# Patient Record
Sex: FEMALE | Race: WHITE | NOT HISPANIC OR LATINO | Employment: FULL TIME | URBAN - METROPOLITAN AREA
[De-identification: names, ages, dates, MRNs, and addresses within clinical notes are randomized per-mention and may not be internally consistent; named-entity substitution may affect disease eponyms.]

---
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
## 2021-01-25 ENCOUNTER — PATIENT OUTREACH (OUTPATIENT)
Dept: OBGYN | Facility: CLINIC | Age: 21
End: 2021-01-25

## 2021-01-25 DIAGNOSIS — R87.610 ASCUS WITH POSITIVE HIGH RISK HPV CERVICAL: ICD-10-CM

## 2021-01-25 DIAGNOSIS — R87.810 ASCUS WITH POSITIVE HIGH RISK HPV CERVICAL: ICD-10-CM

## 2021-01-25 NOTE — LETTER
January 25, 2021      Nehal Dutta  [address]  [place] [address]        Dear ,    This letter is to remind you that you are due for your follow-up Pap smear.    Please call [phone] to schedule your appointment at your earliest convenience.    If you have completed the appointment outside of the [facility] system, please have the records forwarded to our office. We will update your chart for your provider to review before your next annual wellness visit.     Thank you for choosing [facility]!      Sincerely,    Your [facility] Care Team

## 2021-02-03 NOTE — PROGRESS NOTES
SUBJECTIVE:                                                   Nehal Dutta is a 20 year old female who presents to clinic today for the following health issue(s):  Patient presents with:  Vaginal Problem: c/o sometimes painful bump for a little over a month, size of a frozen pea  Abnormal Bleeding Problem: c/o irregular periods, currently has had period for 3.5 weeks, currently on depo shot. Has had before once      HPI: patient has noticed bump up on side of clitoris for last month.  She states is only tender if touches it or something rubs on it.  Is about the size of a pea and has not changed.  She does shave in that area.   She also went off depo provera 3 months ago and she has been bleeding off and on since, this time has lasted for 3-5 weeks.  She has tried OCP's in past but was not good about remembering to take them.  Also noted she was last seen here in 2019 and had a ASCUS pap and positive for other HPV.  She was to return in 6 months for repeat pap and patient did not.  She states she thinks she probably just forgot and has not had a pap since.      Patient's last menstrual period was 2021 (approximate)..     Patient is sexually active, .  Using depo or other injectable and condoms for contraception.    reports that she has never smoked. She has never used smokeless tobacco.    STD testing offered?  Declined    Health maintenance updated:  No, due for pap    Problem list and histories reviewed & adjusted, as indicated.  Additional history: as documented.    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     Past Surgical History:   Procedure Laterality Date     NECK SURGERY  2004    cyst      Social History     Tobacco Use     Smoking status: Never Smoker      "Smokeless tobacco: Never Used   Substance Use Topics     Alcohol use: No     Alcohol/week: 0.0 standard drinks      Problem (# of Occurrences) Relation (Name,Age of Onset)    Breast Cancer (2) Paternal Aunt, Paternal Cousin    Cervical Cancer (1) Mother    Diabetes (1) Maternal Uncle    Hypertension (1) Maternal Grandmother    Myocardial Infarction (1) Father    No Known Problems (5) Sister, Brother, Maternal Grandfather, Paternal Grandmother, Other    Thyroid Disease (1) Maternal Grandmother       Negative family history of: Cancer, Cerebrovascular Disease, Glaucoma, Macular Degeneration            Current Outpatient Medications   Medication Sig     albuterol (PROAIR HFA, PROVENTIL HFA, VENTOLIN HFA) 108 (90 BASE) MCG/ACT inhaler Inhale 2 puffs into the lungs every 6 hours as needed for shortness of breath / dyspnea or wheezing     dicloxacillin (DYNAPEN) 500 MG capsule Take 1 capsule (500 mg) by mouth 3 times daily     FLOVENT  MCG/ACT inhaler Inhale 2 Puffs by mouth 2 times daily.     ipratropium - albuterol 0.5 mg/2.5 mg/3 mL (DUONEB) 0.5-2.5 (3) MG/3ML neb solution Inhale 3 mLs into the lungs     medroxyPROGESTERone (DEPO-PROVERA) 150 MG/ML IM injection Inject 150 mg into the muscle every 3 months     predniSONE (DELTASONE) 20 MG tablet Reported on 3/2/2017     No current facility-administered medications for this visit.      Allergies   Allergen Reactions     Cantaloupe [Melon]      Peanuts [Nuts]      Animal Dander Rash       ROS:  12 point review of systems negative other than symptoms noted below or in the HPI.  Genitourinary: Irregular Menses, Spotting and Vaginal bump        OBJECTIVE:     /62   Pulse 68   Ht 1.549 m (5' 1\")   Wt 42.4 kg (93 lb 6.4 oz)   LMP 01/27/2021 (Approximate)   BMI 17.65 kg/m    Body mass index is 17.65 kg/m .    Exam:  Constitutional:  Appearance: Well nourished, well developed alert, in no acute distress  Neurologic:  Mental Status:  Oriented X3.  Normal " strength and tone, sensory exam grossly normal, mentation intact and speech normal.    Psychiatric:  Mentation appears normal and affect normal/bright.  Pelvic Exam:  External Genitalia:     Normal appearance for age, no discharge present, no tenderness present, no inflammatory lesions present, color normal  On inside of left labia right above clitoris is a pea size lump.  Not red, is tender to touch.  Able to see a white core appears like a ingrown hair.    Vagina:     Normal vaginal vault without central or paravaginal defects, no discharge present, no inflammatory lesions present, no masses present  Bladder:     Nontender to palpation  Urethra:   Urethral Body:  Urethra palpation normal, urethra structural support normal   Urethral Meatus:  No erythema or lesions present  Cervix:     Appearance healthy, no lesions present, nontender to palpation, light bleeding present    Perineum:     Perineum within normal limits, no evidence of trauma, no rashes or skin lesions present  Anus:     Anus within normal limits, no hemorrhoids present  Inguinal Lymph Nodes:     No lymphadenopathy present  Pubic Hair:     Normal pubic hair distribution for age  Genitalia and Groin:     No rashes present, no lesions present, no areas of discoloration, no masses present       In-Clinic Test Results:  No results found for this or any previous visit (from the past 24 hour(s)).    ASSESSMENT/PLAN:                                                        ICD-10-CM    1. ASCUS with positive high risk HPV cervical  R87.610 Pap imaged thin layer screen reflex to HPV if ASCUS - recommended age 25 - 29 years    R87.810    2. Ingrown hair  L73.1 dicloxacillin (DYNAPEN) 500 MG capsule   3. DUB (dysfunctional uterine bleeding)  N93.8 US Transvaginal Non OB     Patient to hot pack pea size lump 2-3 times a day.  Start dicloxacillin.  Return or a pelvic US and to see me after for DUB and will amanda lump.  Pap Done      Earlene Gomez, APRN  CNP  M [facility] [place]

## 2021-02-15 ENCOUNTER — OFFICE VISIT (OUTPATIENT)
Dept: OBGYN | Facility: CLINIC | Age: 21
End: 2021-02-15
Payer: COMMERCIAL

## 2021-02-15 VITALS
DIASTOLIC BLOOD PRESSURE: 62 MMHG | SYSTOLIC BLOOD PRESSURE: 114 MMHG | HEIGHT: 61 IN | WEIGHT: 93.4 LBS | BODY MASS INDEX: 17.64 KG/M2 | HEART RATE: 68 BPM

## 2021-02-15 DIAGNOSIS — R87.810 ASCUS WITH POSITIVE HIGH RISK HPV CERVICAL: Primary | ICD-10-CM

## 2021-02-15 DIAGNOSIS — R87.610 ASCUS WITH POSITIVE HIGH RISK HPV CERVICAL: Primary | ICD-10-CM

## 2021-02-15 DIAGNOSIS — N93.8 DUB (DYSFUNCTIONAL UTERINE BLEEDING): ICD-10-CM

## 2021-02-15 DIAGNOSIS — L73.1 INGROWN HAIR: ICD-10-CM

## 2021-02-15 PROCEDURE — 88175 CYTOPATH C/V AUTO FLUID REDO: CPT | Performed by: NURSE PRACTITIONER

## 2021-02-15 PROCEDURE — 99213 OFFICE O/P EST LOW 20 MIN: CPT | Performed by: NURSE PRACTITIONER

## 2021-02-15 RX ORDER — DICLOXACILLIN SODIUM 500 MG
500 CAPSULE ORAL 3 TIMES DAILY
Qty: 21 CAPSULE | Refills: 0 | Status: SHIPPED | OUTPATIENT
Start: 2021-02-15 | End: 2021-03-01

## 2021-02-15 ASSESSMENT — MIFFLIN-ST. JEOR: SCORE: 1136.04

## 2021-02-17 LAB
COPATH REPORT: NORMAL
PAP: NORMAL

## 2021-02-23 ENCOUNTER — PATIENT OUTREACH (OUTPATIENT)
Dept: OBGYN | Facility: CLINIC | Age: 21
End: 2021-02-23

## 2021-03-01 ENCOUNTER — OFFICE VISIT (OUTPATIENT)
Dept: OBGYN | Facility: CLINIC | Age: 21
End: 2021-03-01
Attending: NURSE PRACTITIONER
Payer: COMMERCIAL

## 2021-03-01 ENCOUNTER — ANCILLARY PROCEDURE (OUTPATIENT)
Dept: ULTRASOUND IMAGING | Facility: CLINIC | Age: 21
End: 2021-03-01
Attending: NURSE PRACTITIONER
Payer: COMMERCIAL

## 2021-03-01 VITALS
SYSTOLIC BLOOD PRESSURE: 110 MMHG | DIASTOLIC BLOOD PRESSURE: 60 MMHG | HEIGHT: 61 IN | WEIGHT: 88.6 LBS | BODY MASS INDEX: 16.73 KG/M2

## 2021-03-01 DIAGNOSIS — N93.8 DUB (DYSFUNCTIONAL UTERINE BLEEDING): ICD-10-CM

## 2021-03-01 DIAGNOSIS — L73.9 FOLLICULITIS: Primary | ICD-10-CM

## 2021-03-01 PROCEDURE — 99212 OFFICE O/P EST SF 10 MIN: CPT | Performed by: NURSE PRACTITIONER

## 2021-03-01 PROCEDURE — 76830 TRANSVAGINAL US NON-OB: CPT | Performed by: OBSTETRICS & GYNECOLOGY

## 2021-03-01 RX ORDER — DICLOXACILLIN SODIUM 500 MG
500 CAPSULE ORAL 3 TIMES DAILY
Qty: 21 CAPSULE | Refills: 0 | Status: SHIPPED | OUTPATIENT
Start: 2021-03-01 | End: 2021-03-01 | Stop reason: ALTCHOICE

## 2021-03-01 ASSESSMENT — MIFFLIN-ST. JEOR: SCORE: 1114.27

## 2021-03-01 NOTE — PROGRESS NOTES
SUBJECTIVE:                                                   Nehal Dutta is a 20 year old female who presents to clinic today for the following health issue(s):  Patient presents with:  Follow Up: US results        HPI: here for follow up on pelvic US and irregular bleeding.  Also to amanda ingrown hair.  Patient states her bleeding has stopped now, cycle seems to be normal.  Just finished dicloxacillin 3 days ago.      Patient's last menstrual period was 2021 (approximate)..     Patient is not sexually active, .  Using none for contraception.    reports that she has never smoked. She has never used smokeless tobacco.    STD testing offered?  Declined    Health maintenance updated:  no    Today's PHQ-2 Score: No flowsheet data found.  Today's PHQ-9 Score:   PHQ-9 SCORE 2017   PHQ-9 Total Score 10     Today's CARRIE-7 Score:   CARRIE-7 SCORE 2017   Total Score 8       Problem list and histories reviewed & adjusted, as indicated.  Additional history: as documented.    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     Past Surgical History:   Procedure Laterality Date     NECK SURGERY  2004    cyst      Social History     Tobacco Use     Smoking status: Never Smoker     Smokeless tobacco: Never Used   Substance Use Topics     Alcohol use: No     Alcohol/week: 0.0 standard drinks      Problem (# of Occurrences) Relation (Name,Age of Onset)    Breast Cancer (2) Paternal Aunt, Paternal Cousin    Cervical Cancer (1) Mother    Diabetes (1) Maternal Uncle    Hypertension (1) Maternal Grandmother    Myocardial Infarction (1) Father    No Known Problems (5) Sister, Brother, Maternal Grandfather, Paternal Grandmother, Other    Thyroid Disease (1) Maternal Grandmother        "Negative family history of: Cancer, Cerebrovascular Disease, Glaucoma, Macular Degeneration            Current Outpatient Medications   Medication Sig     albuterol (PROAIR HFA, PROVENTIL HFA, VENTOLIN HFA) 108 (90 BASE) MCG/ACT inhaler Inhale 2 puffs into the lungs every 6 hours as needed for shortness of breath / dyspnea or wheezing     FLOVENT  MCG/ACT inhaler Inhale 2 Puffs by mouth 2 times daily.     ipratropium - albuterol 0.5 mg/2.5 mg/3 mL (DUONEB) 0.5-2.5 (3) MG/3ML neb solution Inhale 3 mLs into the lungs     predniSONE (DELTASONE) 20 MG tablet Reported on 3/2/2017     No current facility-administered medications for this visit.      Allergies   Allergen Reactions     Cantaloupe [Melon]      Peanuts [Nuts]      Animal Dander Rash       ROS:  12 point review of systems negative other than symptoms noted below or in the HPI.  No urinary frequency or dysuria, bladder or kidney problems      OBJECTIVE:     /60   Ht 1.549 m (5' 1\")   Wt 40.2 kg (88 lb 9.6 oz)   LMP 02/04/2021 (Approximate)   Breastfeeding No   BMI 16.74 kg/m    Body mass index is 16.74 kg/m .    Exam:  Constitutional:  Appearance: Well nourished, well developed alert, in no acute distress  Neurologic:  Mental Status:  Oriented X3.  Normal strength and tone, sensory exam grossly normal, mentation intact and speech normal.    Psychiatric:  Mentation appears normal and affect normal/bright.  Pelvic Exam:  External Genitalia:     Normal appearance for age, no discharge present, no tenderness present, no inflammatory lesions present, color normal  Inside the left labia right above clitoris is ingrown hair which has started to drain serous fluid with some blood tinged.  Appears smaller and softer than previous exam 2 weeks ago.  No redness noted.  Is tender per patient.    Vagina:     Normal vaginal vault without central or paravaginal defects, no discharge present, no inflammatory lesions present, no masses present  Bladder:  "    Nontender to palpation  Urethra:   Urethral Body:  Urethra palpation normal, urethra structural support normal   Urethral Meatus:  No erythema or lesions present  Perineum:     Perineum within normal limits, no evidence of trauma, no rashes or skin lesions present  Anus:     Anus within normal limits, no hemorrhoids present  Inguinal Lymph Nodes:     No lymphadenopathy present  Pubic Hair:     Normal pubic hair distribution for age  Genitalia and Groin:     No rashes present, no lesions present, no areas of discoloration, no masses present  Discussed normal US results.       In-Clinic Test Results:  Results for orders placed or performed in visit on 03/01/21 (from the past 24 hour(s))   US Transvaginal Non OB    Narrative    US Transvaginal Non OB  Order #: [id number] Accession #: [id number]  Study Notes     Georgette Herbert on 3/1/2021  2:15 PM      Gynecological Ultrasound Report  Pelvic U/S - Transvaginal  [facility]  Referring Provider: Earlene Gomez CNP  Sonographer:  Georgette Herbert RDMS  Indication: Bleeding/Menses- Dysfunctional uterine bleeding (DUB)  LMP: No LMP recorded. (Menstrual status: Irregular Periods).     Gynecological Ultrasonography:   Uterus: anteverted. Contour is smooth/regular.  Size: 6.6 x 4.2 x 3.0 cm  Endometrium: Thickness Total 6.9 mm  Right Ovary: 2.6 x 2.4 x 1.8 cm. Wnl  Left Ovary: 3.0 x 2.8 x 1.7 cm. Wnl  Cul de Sac Free Fluid: Trace     Impression: Unremarkable pelvis       TONI DOMÍNGUEZ           ASSESSMENT/PLAN:                                                        ICD-10-CM    1. Folliculitis  L73.9 DISCONTINUED: dicloxacillin (DYNAPEN) 500 MG capsule         Will monitor cycles for now.  Continue hot packing area. If not improving daily and going away    DAPHNE Ross CNP  M [facility] [place]

## 2021-04-13 NOTE — PROGRESS NOTES
SUBJECTIVE:                                                   Nehal Dutta is a 20 year old female who presents to clinic today for the following health issue(s):  Patient presents with:  Patient Request      HPI: Patient is here with her mom.  She is 7 weeks pregnant. She has had a ultrasound to confirm this at [facility] showed a living intrauterine gestation at 7 weeks and 2 days on 21.  EDC is 21.  She has some concerns, she has miscarriage in past, concerned since she is tiny only 90 lbs she won't be able to carry a baby.  She does eat healthy diet.  She has always weighed that weight, denies losing any recently.  She also has a lot of anxiety in general, does not takes medications for it, states she will smoke or does vaping for it.  Discussed she should not be smoking or vaping during pregnancy.  Suggested seeing a therapist, patient states has done that in the past and didn't help per patient.  Patient does have a good support system with mom and sounds like baby's dad will be involved.  Patient has also lost her dad recently.  She does have some nausea, discussed eating 5-6 small meals a day to help.  She did get [facility]an [facility] and does take it occasionally for nausea.  Discussed can only use tylenol if needed.  Patient would like a set of written do's and don't during pregnancy.  She does wish to continue pregnancy wasn't sure at first.  Discussed midwives vs. Physician and she feels she wants to try midwives first.        Patient's last menstrual period was 2021 (approximate)..     Patient is sexually active, .  Using none for contraception.    reports that she has never smoked. She has never used smokeless tobacco.    STD testing offered?  Declined    Health maintenance updated:  yes    Today's PHQ-2 Score: No flowsheet data found.  Today's PHQ-9 Score:   PHQ-9 SCORE 2017   PHQ-9 Total Score 10     Today's CARRIE-7 Score:   CARRIE-7 SCORE 2017   Total Score 8        Problem list and histories reviewed & adjusted, as indicated.  Additional history: as documented.    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     Past Surgical History:   Procedure Laterality Date     NECK SURGERY  2004    cyst      Social History     Tobacco Use     Smoking status: Never Smoker     Smokeless tobacco: Never Used   Substance Use Topics     Alcohol use: No     Alcohol/week: 0.0 standard drinks      Problem (# of Occurrences) Relation (Name,Age of Onset)    Breast Cancer (2) Paternal Aunt, Paternal Cousin    Cervical Cancer (1) Mother    Diabetes (1) Maternal Uncle    Hypertension (1) Maternal Grandmother    Myocardial Infarction (1) Father    No Known Problems (5) Sister, Brother, Maternal Grandfather, Paternal Grandmother, Other    Thyroid Disease (1) Maternal Grandmother       Negative family history of: Cancer, Cerebrovascular Disease, Glaucoma, Macular Degeneration            Current Outpatient Medications   Medication Sig     albuterol (PROAIR HFA, PROVENTIL HFA, VENTOLIN HFA) 108 (90 BASE) MCG/ACT inhaler Inhale 2 puffs into the lungs every 6 hours as needed for shortness of breath / dyspnea or wheezing     FLOVENT  MCG/ACT inhaler Inhale 2 Puffs by mouth 2 times daily.     ipratropium - albuterol 0.5 mg/2.5 mg/3 mL (DUONEB) 0.5-2.5 (3) MG/3ML neb solution Inhale 3 mLs into the lungs     ondansetron (ZOFRAN) 4 MG tablet Take 4-8 mg by mouth     Prenat w/o A-FeCbGl-DSS-FA-DHA (CITRANATAL DHA) 27-1 & 250 MG MISC Take 1 tablet by mouth daily     No current facility-administered medications for this visit.      Allergies   Allergen Reactions     Cantaloupe [Melon]      Peanuts [Nuts]      Animal Dander Rash       ROS:  12 point review of systems negative  "other than symptoms noted below or in the HPI.        OBJECTIVE:     BP 90/58 (BP Location: Right arm, Patient Position: Sitting, Cuff Size: Adult Regular)   Pulse 92   Ht 1.549 m (5' 1\")   Wt 40.9 kg (90 lb 3.2 oz)   LMP 02/07/2021 (Approximate)   Breastfeeding No   BMI 17.04 kg/m    Body mass index is 17.04 kg/m .    Exam:  Constitutional:  Appearance: Well nourished, well developed alert, in no acute distress  Neurologic:  Mental Status:  Oriented X3.  Normal strength and tone, sensory exam grossly normal, mentation intact and speech normal.    Psychiatric:  Mentation appears normal and affect normal/bright.     In-Clinic Test Results:  No results found for this or any previous visit (from the past 24 hour(s)).    ASSESSMENT/PLAN:                                                        ICD-10-CM    1. Amenorrhea  N91.2 CANCELED: HCG Qual, Urine ([id number])   2. Pregnancy test positive  Z32.01 Prenat w/o A-FeCbGl-DSS-FA-DHA (CITRANATAL DHA) 27-1 & 250 MG MISC         Patient referred to midwives, next appointment in 2 weeks.  Set her up for Triage nurses to call her with first prenatal visit information and also will refer Jayla Torres  to help her.    DAPHNE Ross CNP  [facility] [place]  "

## 2021-04-14 ENCOUNTER — OFFICE VISIT (OUTPATIENT)
Dept: OBGYN | Facility: CLINIC | Age: 21
End: 2021-04-14
Payer: COMMERCIAL

## 2021-04-14 ENCOUNTER — TELEPHONE (OUTPATIENT)
Dept: OBGYN | Facility: CLINIC | Age: 21
End: 2021-04-14

## 2021-04-14 VITALS
BODY MASS INDEX: 17.03 KG/M2 | DIASTOLIC BLOOD PRESSURE: 58 MMHG | HEIGHT: 61 IN | WEIGHT: 90.2 LBS | HEART RATE: 92 BPM | SYSTOLIC BLOOD PRESSURE: 90 MMHG

## 2021-04-14 DIAGNOSIS — N91.2 AMENORRHEA: Primary | ICD-10-CM

## 2021-04-14 DIAGNOSIS — Z32.01 PREGNANCY TEST POSITIVE: ICD-10-CM

## 2021-04-14 DIAGNOSIS — F41.9 ANXIETY: Primary | ICD-10-CM

## 2021-04-14 PROCEDURE — 99213 OFFICE O/P EST LOW 20 MIN: CPT | Performed by: NURSE PRACTITIONER

## 2021-04-14 RX ORDER — ONDANSETRON 4 MG/1
4-8 TABLET, FILM COATED ORAL
COMMUNITY
Start: 2021-04-08 | End: 2021-04-21

## 2021-04-14 RX ORDER — VITAMIN C, CALCIUM, IRON, VITAMIN D3, VITAMIN E, THIAMIN, RIBOFLAVIN, NIACINAMIDE, VITAMIN B6, FOLIC ACID, IODINE, ZINC, COPPER, DOCUSATE SODIUM
1 KIT DAILY
Qty: 90 EACH | Refills: 3 | Status: SHIPPED | OUTPATIENT
Start: 2021-04-14

## 2021-04-14 ASSESSMENT — MIFFLIN-ST. JEOR: SCORE: 1121.52

## 2021-04-15 ENCOUNTER — TELEPHONE (OUTPATIENT)
Dept: OBGYN | Facility: CLINIC | Age: 21
End: 2021-04-15

## 2021-04-15 RX ORDER — SERTRALINE HYDROCHLORIDE 25 MG/1
25 TABLET, FILM COATED ORAL DAILY
Qty: 30 TABLET | Refills: 0 | Status: SHIPPED | OUTPATIENT
Start: 2021-04-15 | End: 2021-07-15

## 2021-04-15 NOTE — TELEPHONE ENCOUNTER
Informed pharmacy to fill whichever PNV is covered with DHA in it.   Yaneth Brambila RN on 4/15/2021 at 10:32 AM

## 2021-04-15 NOTE — TELEPHONE ENCOUNTER
Spoke with pt, signed her up for MyChart and sent prenatal information  Advised against vaping for anxiety, pt is open to trying Zoloft-rx sent  Discussed pt care coordination-open to this as well-referral sent and [facility] notified   Yaneth Brambila RN on 4/15/2021 at 2:46 PM

## 2021-04-15 NOTE — TELEPHONE ENCOUNTER
rec'd rejection from pharmacy that insurance does not cover prenatal that was sent.    Triage: is there an prescription to send that they can fill whatever is covered by insurance? Or does the patient need to find out that info and let us know?

## 2021-04-16 ENCOUNTER — PATIENT OUTREACH (OUTPATIENT)
Dept: NURSING | Facility: CLINIC | Age: 21
End: 2021-04-16
Payer: MEDICAID

## 2021-04-16 SDOH — ECONOMIC STABILITY: INCOME INSECURITY: HOW HARD IS IT FOR YOU TO PAY FOR THE VERY BASICS LIKE FOOD, HOUSING, MEDICAL CARE, AND HEATING?: NOT HARD AT ALL

## 2021-04-16 SDOH — SOCIAL STABILITY: SOCIAL NETWORK: HOW OFTEN DO YOU GET TOGETHER WITH FRIENDS OR RELATIVES?: MORE THAN THREE TIMES A WEEK

## 2021-04-16 SDOH — ECONOMIC STABILITY: TRANSPORTATION INSECURITY
IN THE PAST 12 MONTHS, HAS THE LACK OF TRANSPORTATION KEPT YOU FROM MEDICAL APPOINTMENTS OR FROM GETTING MEDICATIONS?: NO

## 2021-04-16 SDOH — ECONOMIC STABILITY: FOOD INSECURITY: WITHIN THE PAST 12 MONTHS, YOU WORRIED THAT YOUR FOOD WOULD RUN OUT BEFORE YOU GOT MONEY TO BUY MORE.: NEVER TRUE

## 2021-04-16 SDOH — ECONOMIC STABILITY: TRANSPORTATION INSECURITY
IN THE PAST 12 MONTHS, HAS LACK OF TRANSPORTATION KEPT YOU FROM MEETINGS, WORK, OR FROM GETTING THINGS NEEDED FOR DAILY LIVING?: NO

## 2021-04-16 SDOH — SOCIAL STABILITY: SOCIAL NETWORK: ARE YOU MARRIED, WIDOWED, DIVORCED, SEPARATED, NEVER MARRIED, OR LIVING WITH A PARTNER?: NEVER MARRIED

## 2021-04-16 SDOH — ECONOMIC STABILITY: FOOD INSECURITY: WITHIN THE PAST 12 MONTHS, THE FOOD YOU BOUGHT JUST DIDN'T LAST AND YOU DIDN'T HAVE MONEY TO GET MORE.: NEVER TRUE

## 2021-04-16 SDOH — HEALTH STABILITY: MENTAL HEALTH
STRESS IS WHEN SOMEONE FEELS TENSE, NERVOUS, ANXIOUS, OR CAN'T SLEEP AT NIGHT BECAUSE THEIR MIND IS TROUBLED. HOW STRESSED ARE YOU?: TO SOME EXTENT

## 2021-04-16 SDOH — SOCIAL STABILITY: SOCIAL NETWORK
IN A TYPICAL WEEK, HOW MANY TIMES DO YOU TALK ON THE PHONE WITH FAMILY, FRIENDS, OR NEIGHBORS?: MORE THAN THREE TIMES A WEEK

## 2021-04-16 ASSESSMENT — ACTIVITIES OF DAILY LIVING (ADL): DEPENDENT_IADLS:: INDEPENDENT

## 2021-04-16 NOTE — PROGRESS NOTES
Clinic Care Coordination Contact    Clinic Care Coordination Contact  OUTREACH    Referral Information:  Referral Source: Care Team     Primary Diagnosis: Psychosocial    Chief Complaint   Patient presents with     Clinic Care Coordination - Initial        Universal Utilization:   Clinic Utilization  Difficulty keeping appointments:: No  Compliance Concerns: No  No-Show Concerns: No  No PCP office visit in Past Year: No  Utilization    Last refreshed: 2021 11:47 AM: Hospital Admissions 0           Last refreshed: 2021 11:47 AM: ED Visits 0           Last refreshed: 2021 11:47 AM: No Show Count (past year) 0              Current as of: 2021 11:47 AM            Clinical Concerns:  CC BALAJI spoke with pt regarding her overall wellbeing. Pt shared that she continues to experience significant nausea and vomits many times a day. CC BALAJI reviewed MyChart message from 4/15 and encouraged pt to review as well. Pt is taking zofran with some success.    Pt inquired about her concerns that significant stress will lead to a miscarriage. CC BALAJI encouraged her to discuss this with midwives but explained that many miscarriages in the early stages of pregnancy are not preventable nor the fault of the mother.    Pt explained many life stressors at this time, including trouble with FOB and plans for future stability for herself and baby. Pt explained that she and FOB are not together because it was not a healthy relationship and she experienced significant anxiety. FOB stated that he would like her to get an  if she is unwilling to try to work it out with him. She is very sad by this and worries about how to explain his absence to baby in the future. GILMA CARPIO will provide resources for support groups or online groups for single parents that she can learn from others with similar experiences.    Pt would like to go to college and be a . She explained she doesn't want to be dependent on her mother forever. GILMA  BALAJI shared there are many programs that offer support to parents to further their education.     Coping skills for anxiety discussed. Currently, pt relaxes while watching Netflix, spends time with some girlfriends, and she has started journaling. Pt explained she used to have significant depression and she would self harm through cutting, she stopped this a couple years ago. She used to smoke marijuana to calm nerves and it was helpful. She was told not use during pregnancy and plans not to. She is currently, vaping but is trying to stop. She is worried about the effect this is having on baby, GILMA CARPIO encouraged her to speak with midwives about this. GILMA CARPIO advised against turning to [employer] for her concerns as this may lead to misinformation.    In high school she was taking anti-depressants for a couple months. When she stopped she became suicidal, this was a couple years ago. Pt is not currently experiencing any SI and has not for a long-time.    Pt has been fully vaccinated again COVID and is planning a trip to [place] in 2 months with her family.     Current Medical Concerns:  none    Current Behavioral Concerns: Anxiety    Education Provided to patient: GILMA role   Pain  Pain (GOAL):: No  Health Maintenance Reviewed: Up to date  Clinical Pathway: None    Medication Management:  Did not discuss at this time.    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 She enjoys her job and walks a lot. She is able to get healthy meals while she is working.    Dependent ADLs:: Independent  Dependent IADLs:: Independent  Bed or wheelchair confined:: No  Mobility Status: Independent  Fallen 2 or more times in the past year?: No  Any fall with injury in the past year?: No    Living Situation:  Pt is currently living with a roommate in the [facility]. She feels very supported by her roommate.     Pt is considering moving back to her mother's home in [place], [place] for the additional  support her mother can provide.    Lifestyle & Psychosocial Needs:  Lifestyle     Physical activity     Days per week: Not on file     Minutes per session: Not on file     Stress: To some extent     Social Needs     Financial resource strain: Not hard at all     Food insecurity     Worry: Never true     Inability: Never true     Transportation needs     Medical: No     Non-medical: No     Diet:: Regular  Inadequate nutrition (GOAL):: No  Tube Feeding: No  Inadequate activity/exercise (GOAL):: No  Significant changes in sleep pattern (GOAL): No  Transportation means:: Regular car     Alevism or spiritual beliefs that impact treatment:: No  Mental health DX:: No  Mental health management concern (GOAL):: No  Chemical Dependency Status: No Current Concerns  Informal Support system:: Family, Parent   Socioeconomic History     Marital status: Single     Spouse name: Not on file     Number of children: 0     Years of education: Not on file     Highest education level: Not on file   Occupational History     Occupation: student     Comment: [employer]     Social connections     Talks on phone: More than three times a week     Gets together: More than three times a week     Attends Jain service: Not on file     Active member of club or organization: Not on file     Attends meetings of clubs or organizations: Not on file     Relationship status: Never      Intimate partner violence     Fear of current or ex partner: Not on file     Emotionally abused: Not on file     Physically abused: Not on file     Forced sexual activity: Not on file     Tobacco Use     Smoking status: Never Smoker     Smokeless tobacco: Never Used   Substance and Sexual Activity     Alcohol use: No     Alcohol/week: 0.0 standard drinks     Drug use: No     Sexual activity: Yes     Partners: Male     Birth control/protection: Injection, Condom      Resources and Interventions:  Current Resources:    Community Resources:  [facility]([place])  Supplies used at home:: None  Equipment Currently Used at Home: none  Employment Status: employed full-time)   )    Advance Care Plan/Directive  Advanced Care Plans/Directives on file:: No    Referrals Placed: Other (support groups)    Goals        Patient Stated      1. Psychosocial (pt-stated)      Goal Statement: Before due date in November, I would like to explore the options for my future to support myself and my baby after birth to ensure our overall health and wellbeing.  Date Goal set: 4/16/2021  Barriers: None  Strengths: Strong support system, very motivated to ensure my wellbeing and what is best for baby  Date to Achieve By: 11/1/2021  Patient expressed understanding of goal: yes  Action steps to achieve this goal:  1. I will explore ways to manage my anxiety  2. I will present all questions related to concerns for pregnancy or baby to midwives or care team  3. I will outreach to Care Coordination  for further questions or concerns           Patient/Caregiver understanding: Pt reports understanding and denies any additional questions or concerns at this times. SW CC engaged in AIDET communication during encounter.    Outreach Frequency: monthly  Future Appointments              In 1 week WE TRIAGE [facility]OM    In 2 weeks Ava Pinto APRN CNM [facility] WO        Plan: CC SW will outreach to pt next month to discuss her overall wellbeing.    Navya Torres, [facility]  Clinic Care Coordinator  [facility] [place]  [facility]  [phone]  [email or web address]

## 2021-04-16 NOTE — LETTER
[facility] CARE COORDINATION  [address] [place] [address]    2021    Nehal Dutta  [address]  [place] [address]      Dear Nehal,    I am a clinic care coordinator who works with [facility]  [facility]. I wanted to thank you for spending the time to talk with me.  Below is a description of clinic care coordination and how I can further assist you.      The clinic care coordination team is made up of a registered nurse,  and community health worker who understand the health care system. The goal of clinic care coordination is to help you manage your health and improve access to the health care system in the most efficient manner. The team can assist you in meeting your health care goals by providing education, coordinating services, strengthening the communication among your providers and supporting you with any resource needs.    Please feel free to contact me at [phone] with any questions or concerns. We are focused on providing you with the highest-quality healthcare experience possible and that all starts with you.     Sincerely,     YURIY Holguin    Enclosed: I have enclosed a copy of the Complex Care Plan. This has helpful information and goals that we have talked about. Please keep this in an easy to access place to use as needed.        Support for during and after pregnancy:    https://ppsupport[place].org/  **This is an amazing resource so look around the website to see what might be helpful    Below if the link to the resources more specific to what we discussed today    - [email or web address]/-postpartum-support-[email or web address]-[email or web address]

## 2021-04-16 NOTE — LETTER
Count includes the [facility]  Complex Care Plan  About Me:    Patient Name:  Nehal Dutta    YOB: 2000  Age:         20 year old   Horace MRN:    [id number] Telephone Information:  Home Phone [phone]   Mobile [phone]       Address:  [address] [place] [address] Email address:  [email or web address]      Emergency Contact(s)    Name Relationship Lgl Grd Work Phone Home Phone Mobile Phone   1HARRIS GARZA Mother    [phone]           Health Maintenance  Health Maintenance Reviewed: Up to date    My Access Plan  Medical Emergency 911   Primary Clinic Line [facility] [phone]   24 Hour Appointment Line [phone] or  [phone]) (toll-free)   24 Hour Nurse Line [phone] (toll-free)   Preferred Urgent Care Other   Preferred Hospital [facility]  [phone]   Preferred Pharmacy [id number] - [place]     Behavioral Health Crisis Line The National Suicide Prevention Lifeline at 1-642.159.7265 or 915       My Care Team Members  Patient Care Team       Relationship Specialty Notifications Start End    No Ref-Primary, Physician PCP - General   6/14/19     Fax: [phone]         Earlene Gomez APRN CNP Assigned OBGYN Provider   2/21/21     Phone: [phone] Fax: [phone] [address] [address]    Jayla Torres LGSW Lead Care Coordinator Primary Care - CC Admissions 4/16/21             My Care Plans  Self Management and Treatment Plan  Goals and (Comments)  Goals        General    1. Psychosocial (pt-stated)     Notes - Note edited  4/16/2021 12:34 PM by Jayla Torres LGSW    Goal Statement: Before due date in November, I would like to explore the options for my future to support myself and my baby after birth to ensure our overall health and wellbeing.  Date Goal set: 4/16/2021  Barriers: None  Strengths: Strong support system,  very motivated to ensure my wellbeing and what is best for baby  Date to Achieve By: 11/1/2021  Patient expressed understanding of goal: yes  Action steps to achieve this goal:  1. I will explore ways to manage my anxiety  2. I will present all questions related to concerns for pregnancy or baby to midwives or care team  3. I will outreach to Care Coordination  for further questions or concerns                  My Medical and Care Information  Problem List   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        Care Coordination Start Date: 4/16/2021   Frequency of Care Coordination: monthly   Form Last Updated: 04/16/2021

## 2021-04-21 ENCOUNTER — NURSE TRIAGE (OUTPATIENT)
Dept: NURSING | Facility: CLINIC | Age: 21
End: 2021-04-21

## 2021-04-21 ENCOUNTER — TELEPHONE (OUTPATIENT)
Dept: MIDWIFE SERVICES | Facility: CLINIC | Age: 21
End: 2021-04-21

## 2021-04-21 DIAGNOSIS — O21.9 NAUSEA AND VOMITING IN PREGNANCY: Primary | ICD-10-CM

## 2021-04-21 RX ORDER — ONDANSETRON 4 MG/1
4-8 TABLET, FILM COATED ORAL EVERY 6 HOURS PRN
Qty: 30 TABLET | Refills: 1 | Status: SHIPPED | OUTPATIENT
Start: 2021-04-21 | End: 2021-05-10

## 2021-04-21 NOTE — TELEPHONE ENCOUNTER
Pt called and is out of her Zofran and thought her pharmacy had sent over a request 2 days as she is out.

## 2021-04-21 NOTE — TELEPHONE ENCOUNTER
Was originally prescribed Zofran from [facility]  Typically takes 2 tabs in the morning which has been effective  Has not tried any of the tips sent to her via [employer] and discussed via phone  Really encouraged Vit B 6 and Unisom at least to try as prescriptions are not first line treatment for N/V in early pregnancy  Seemed like this was overwhelming information for Nehal  She will review the tips sent to her via [employer]  Is able to stay hydrated  No emesis today-previously per pt had been throwing up 4-5 times a day     Is at work until 7 tonight so will send her a [employer] response  Yaneth Brambila RN on 4/21/2021 at 2:17 PM

## 2021-04-22 NOTE — TELEPHONE ENCOUNTER
Left message on VM.    Cramping can be normal in early pregnancy. Rest and drink water. If it doesn't go away or vaginal bleeding is occurring then call the nurse line back. We may have you come in tomorrow to look for a UTI if light cramping is still present. Please call back with any questions or concerns.    Anirudh Mahoney, SEGUN, APRN, CNM

## 2021-04-22 NOTE — TELEPHONE ENCOUNTER
"Pt reports she is eight weeks pregnant. ASHISH 11/27/21. Pt reports \"feels similar to period cramps but not as sharp, constant for past 1.5 hours\". Pt rates pain \"5-6\". Pt reports she also felt some dizziness and lightheadedness at work today. Pt reports stressed at work.  Pt denies fever, vaginal bleeding or discharge or any other acute symptoms.  Pt reports she plans to see CNM's and has spoken to one on the phone.     Advised pt CNM will be paged to contact her, call back if no response in 30 minutes or if symptoms worsening. Rest and increase fluids, elevate legs.     Pt verbalizes understanding and agrees to plan.     Jennifer street RN 04/21/21 7:47 PM  [facility] Nurse Advisor    Reason for Disposition    MODERATE-SEVERE abdominal pain (e.g., interferes with normal activities, awakens from sleep)    Additional Information    Negative: Passed out (i.e., lost consciousness, collapsed and was not responding)    Negative: Shock suspected (e.g., cold/pale/clammy skin, too weak to stand, low BP, rapid pulse)    Negative: Difficult to awaken or acting confused (e.g., disoriented, slurred speech)    Negative: Sounds like a life-threatening emergency to the triager    Negative: Followed an abdomen (stomach) injury    Negative: [1] Abdominal pain AND [2] pregnant > 20 weeks    Protocols used: PREGNANCY - ABDOMINAL PAIN LESS THAN 20 WEEKS EGA-A-AH      "

## 2021-04-23 ENCOUNTER — PRENATAL OFFICE VISIT (OUTPATIENT)
Dept: NURSING | Facility: CLINIC | Age: 21
End: 2021-04-23
Payer: COMMERCIAL

## 2021-04-23 DIAGNOSIS — Z34.01 ENCOUNTER FOR SUPERVISION OF NORMAL FIRST PREGNANCY IN FIRST TRIMESTER: ICD-10-CM

## 2021-04-23 PROCEDURE — 99207 PR NO CHARGE NURSE ONLY: CPT

## 2021-04-23 NOTE — PROGRESS NOTES
SUBJECTIVE:     HPI:    This is a 20 year old female patient,  who presents for her first obstetrical visit.    ASHISH: 2021, by Ultrasound.  She is 8w6d weeks.  Her cycles are irregular.  Her last menstrual period was abnormal.   Since her LMP, she has experienced  nausea, emesis and loss of appetite).   She denies abdominal pain, fatigue, headache, vaginal discharge, dysuria, pelvic pain, urinary urgency, lightheadedness, urinary frequency, vaginal bleeding, hemorrhoids and constipation.    Additional History: Currently vaping daily-uses every few hours. Hx of . asthma-does use inhaler daily. Anxiety hx of self harm-cutting.. Increased stress d/t not currently with Odilia.   Does have good family support.   GERD, Low BMI  Care coordination referral previously placed    Have you travelled during the pregnancy?No  Have your sexual partner(s) travelled during the pregnancy?No      HISTORY:   Planned Pregnancy: No  Marital Status: Single  Occupation:   Living in Household: Other:  roomate    Past History:  Her past medical history   Past Medical History:   Diagnosis Date     Abnormal Pap smear of cervix 10/02/2019    See problem list     Depressive disorder      Dysmenorrhea 2016     Uncomplicated asthma    .      She has a history of  first pregnancy     Since her last LMP she denies use of alcohol and street drugs and admits to the use of vaping.    Past medical, surgical, social and family history were reviewed and updated in [facility].        Current Outpatient Medications   Medication     albuterol (PROAIR HFA, PROVENTIL HFA, VENTOLIN HFA) 108 (90 BASE) MCG/ACT inhaler     ondansetron (ZOFRAN) 4 MG tablet     Prenat w/o A-FeCbGl-DSS-FA-DHA (CITRANATAL DHA) 27-1 & 250 MG MISC     sertraline (ZOLOFT) 25 MG tablet     FLOVENT  MCG/ACT inhaler     ipratropium - albuterol 0.5 mg/2.5 mg/3 mL (DUONEB) 0.5-2.5 (3) MG/3ML neb solution     No current facility-administered medications for  this visit.        ROS:   12 point review of systems negative other than symptoms noted below or in the HPI.  Constitutional: Loss of Appetite  Gastrointestinal: Nausea and Vomiting      OBJECTIVE:     EXAM:  LMP 02/07/2021 (Approximate)  There is no height or weight on file to calculate BMI.      Nurse phone visit completed. Prenatal book and folder (containing standard educational hand-outs and brochures)  will be given at the next visit to patient. Information in folder reviewed over the phone. Questions answered. Brochure given on optional screening available to assess chromosomal anomalies. Pt unsure NIPT. Pt advised to call the clinic if she has any questions or concerns related to her pregnancy. Prenatal labs future ordered. New prenatal visit scheduled on 4/30/21 with Nicole Pinto CNM.      Lab Results   Component Value Date    PAP NIL 02/15/2021     PHQ-9 score:    PHQ 4/23/2021   PHQ-9 Total Score 8   Q9: Thoughts of better off dead/self-harm past 2 weeks Not at all         CARRIE-7 SCORE 2/13/2017 4/23/2021   Total Score - 13 (moderate anxiety)   Total Score 8 13             Patient supplied answers from flow sheet for:  Prenatal OB Questionnaire.  Past Medical History  Have you ever recieved care for your mental health? : (!) Yes  Have you ever been in a major accident or suffered serious trauma?: (!) Yes(Found Dad after having MI)   was medically recommended?: (!) No  Does anyone in your home smoke?: (!) Yes  Have you been hospitalized for a nonsurgical reason excluding normal delivery?: (!) Yes(in childhood for asthma, no recent hospitalizations)  Have you ever had an abnormal pap smear?: (!) Yes    Krissy Carnes RN

## 2021-04-29 NOTE — PROGRESS NOTES
SUBJECTIVE:      HPI:   This is a 20 year old female patient,  who presents for her first obstetrical visit. Here with Jarred, not planned but families are supportive. Works as a  and has been having some cramping/pelvic discomfort, needs lifting restrictions note. Today is the 2yr anniversary of her dad passing away but feels like she is managing her moods okay, improved since she was assessed two weeks ago. Using nausea meds in the morning, taking prenatal at night which is helpful. Not ready to quit vaping.      ASHISH: 2021, by Ultrasound.  She is 8w6d weeks.  Her cycles are irregular.  Her last menstrual period was abnormal.   Since her LMP, she has experienced  nausea, emesis and loss of appetite)  She denies abdominal pain, fatigue, headache, vaginal discharge, dysuria, pelvic pain, urinary urgency, lightheadedness, urinary frequency, vaginal bleeding, hemorrhoids and constipation.     Additional History: Currently vaping daily-uses every few hours. Hx of . asthma-does use inhaler daily. Anxiety hx of self harm-cutting.. Increased stress d/t not currently with FOB-Jarred.   Does have good family support.   GERD, Low BMI  Care coordination referral previously placed     Have you travelled during the pregnancy?No  Have your sexual partner(s) travelled during the pregnancy?No        HISTORY:   Planned Pregnancy: No  Marital Status: Single  Occupation:   Living in Household: Other:  roomate     Past History:  Her past medical history   Past Medical History        Past Medical History:   Diagnosis Date     Abnormal Pap smear of cervix 10/02/2019     See problem list     Depressive disorder       Dysmenorrhea 2016     Uncomplicated asthma        .       She has a history of  first pregnancy      Since her last LMP she denies use of alcohol and street drugs and admits to the use of vaping.     Past medical, surgical, social and family history were reviewed and updated in EPIC.    OB  HISTORY  OB History    Para Term  AB Living   1 0 0 0 0 0   SAB TAB Ectopic Multiple Live Births   0 0 0 0 0      # Outcome Date GA Lbr Magnus/2nd Weight Sex Delivery Anes PTL Lv   1 Current                PERSONAL HISTORY  Exercise Habits:  Very active job, >7 miles on feet per shift.  Employment: Full time.  Her job involves heavy activity   Travel plans:  are to [place] in  x 2 week, driving.   Diet: eats regular meals  Abuse concerns? Reassess when partner not present  Hgb A1c screen:  BMI > 30: No, 1st degree family DM: No, History of GDM: No, PCOS: No, High risk ethnicity: No              Current Outpatient Medications   Medication     albuterol (PROAIR HFA, PROVENTIL HFA, VENTOLIN HFA) 108 (90 BASE) MCG/ACT inhaler     ondansetron (ZOFRAN) 4 MG tablet     Prenat w/o A-FeCbGl-DSS-FA-DHA (CITRANATAL DHA) 27-1 & 250 MG MISC     sertraline (ZOLOFT) 25 MG tablet     FLOVENT  MCG/ACT inhaler     ipratropium - albuterol 0.5 mg/2.5 mg/3 mL (DUONEB) 0.5-2.5 (3) MG/3ML neb solution      No current facility-administered medications for this visit.          ROS:   12 point review of systems negative other than symptoms noted below or in the HPI.  Constitutional: Loss of Appetite  Gastrointestinal: Nausea and Vomiting        OBJECTIVE:      EXAM:  LMP 2021 (Approximate)  There is no height or weight on file to calculate BMI.        Nurse phone visit completed. Prenatal book and folder (containing standard educational hand-outs and brochures)  will be given at the next visit to patient. Information in folder reviewed over the phone. Questions answered. Brochure given on optional screening available to assess chromosomal anomalies. Pt unsure NIPT. Pt advised to call the clinic if she has any questions or concerns related to her pregnancy. Prenatal labs future ordered. New prenatal visit scheduled on 21 with Nicole Pinto CNM.              Lab Results   Component Value Date     PAP NIL  "02/15/2021      PHQ-9 score:    PHQ 4/23/2021   PHQ-9 Total Score 8   Q9: Thoughts of better off dead/self-harm past 2 weeks Not at all            CARRIE-7 SCORE 2/13/2017 4/23/2021   Total Score - 13 (moderate anxiety)   Total Score 8 13              Patient supplied answers from flow sheet for:  Prenatal OB Questionnaire.  Past Medical History  Have you ever recieved care for your mental health? : (!) Yes  Have you ever been in a major accident or suffered serious trauma?: (!) Yes(Found Dad after having MI)   was medically recommended?: (!) No  Does anyone in your home smoke?: (!) Yes  Have you been hospitalized for a nonsurgical reason excluding normal delivery?: (!) Yes(in childhood for asthma, no recent hospitalizations)  Have you ever had an abnormal pap smear?: (!) Yes     Krissy Carnes RN            OBJECTIVE:     EXAM:  /60 (BP Location: Right arm, Patient Position: Sitting, Cuff Size: Adult Regular)   Pulse 84   Ht 1.562 m (5' 1.5\")   Wt 41.4 kg (91 lb 3.2 oz)   LMP 02/07/2021 (Approximate)   Breastfeeding No   BMI 16.95 kg/m   Body mass index is 16.95 kg/m .    GENERAL: healthy, alert and no distress  NECK: no adenopathy, no asymmetry, masses, or scars and thyroid normal to palpation  RESP: lungs clear to auscultation - no rales, rhonchi or wheezes  CV: regular rate and rhythm, normal S1 S2, no S3 or S4, no murmur, click or rub, no peripheral edema and peripheral pulses strong  ABDOMEN: soft, nontender, no hepatosplenomegaly, no masses and bowel sounds normal    MS: no gross musculoskeletal defects noted, no edema  SKIN: no suspicious lesions or rashes  NEURO: Normal strength and tone, mentation intact and speech normal  PSYCH: mentation appears normal, affect normal/bright    ASSESSMENT/PLAN:       ICD-10-CM    1. Supervision of high risk pregnancy in first trimester  O09.91 UA reflex to Microscopic     Urine Culture Aerobic Bacterial     Treponema Abs w Reflex to RPR and Titer     " Rubella Antibody IgG Quantitative     HIV Antigen Antibody Combo     Hepatitis B surface antigen     CBC with platelets     ABO/Rh type and screen     Neisseria gonorrhoeae PCR     Chlamydia trachomatis PCR     CANCELED: *UA reflex to Microscopic     CANCELED: Urine Culture Aerobic Bacterial     CANCELED: Treponema Abs w Reflex to RPR and Titer     CANCELED: Rubella Antibody IgG Quantitative     CANCELED: HIV Antigen Antibody Combo     CANCELED: Hepatitis B surface antigen     CANCELED: CBC with platelets     CANCELED: ABO/Rh type and screen   2. Nausea and vomiting during pregnancy prior to 22 weeks gestation  O21.9    3. Anxiety  F41.9 CARE COORDINATION REFERRAL   4. 9 weeks gestation of pregnancy  Z3A.09    5. BMI less than 19,adult  Z68.1    6. Mild persistent asthma without complication  J45.30    7. Nicotine dependence due to vaping tobacco product  F17.290        20 year old , On 2021 patient is 9w5d weeks of pregnancy with ASHISH of 2021, by Ultrasound     consult for US for AMA patients: NA  Genetic Testing reviewed and discussed, patient desires Innatal, reviewed chrosomal abnormalities the test adresses. Handout provided, consent reviewed and signed.     COUNSELING    Instructed on use of triage nurse line and contacting the on call CNM after hours in an emergency.     Symptoms of N&V and fatigue usually start to resolve around 12-16 weeks     Reviewed CNM philosophy, call schedule for labor and delivery, and FSH for delivery    1st OB handout given outlining appointment spacing and CNM information    Reviewed exercise and nutrition    Recommend to gain >40 pounds with her pregnancy.    Discussed OTC medications/OB med list given    Encouraged patient to take PNV's/DHA    Travel precautions discussed, no air travel after 36 weeks and COVID recommendations discussed, encouraged mask wearing and safety while traveling    Discussed frequent breaks/good supportive shoes at work  "and lifting restrictions     Reviewed next US at 20 weeks, FHTs heard today, will do at each visit, she can request images from [facility] but dating US was normal there and no medical indication for repeat    Years since she has been seen for Asthma, will let us know if she has any issues and we can place a referral for treatment if needed    Reviewed mental health, can offer therapy referral or dose increase if     Patient was going to have labs drawn today but left urine and did not get drawn, left voicemail for her to come back next week for lab only visit and make appt for lab draw with genetic testing anytime after 10 weeks, orders are in place, consent is signed    Next visit will be at 12 weeks    F/U to be addressed next visit: offer GC in urine at next visit or next week with lab draw  **Discussed Baby ASA at next visit 2 risk factors smoking and nulliparity    When I called Nehal to discuss lab visit next week she asked about urine results and any concerning findings, she reports she is worried about \"Hep B\" was diagnosed over a year ago per patient but not treated and was told it \"would go away on its own\", unable to find dx of hep B, but discussed it is part of routine labs and will screen for it next week. Upon further chart review + UPT 09/2020, Nehal describes passing big clots but denies true pregnancy as she said there was no baby just gestational sac. She does report US was done. She is unsure on exact date, no evidence in chart of US.    Will return to the clinic in 2 weeks for her next routine prenatal check.  Will call to be seen sooner if problems arise.      Ava Pinto, DAPHNE VICKERS    "

## 2021-04-30 ENCOUNTER — PRENATAL OFFICE VISIT (OUTPATIENT)
Dept: MIDWIFE SERVICES | Facility: CLINIC | Age: 21
End: 2021-04-30
Payer: COMMERCIAL

## 2021-04-30 VITALS
WEIGHT: 91.2 LBS | HEART RATE: 84 BPM | DIASTOLIC BLOOD PRESSURE: 60 MMHG | SYSTOLIC BLOOD PRESSURE: 100 MMHG | HEIGHT: 62 IN | BODY MASS INDEX: 16.78 KG/M2

## 2021-04-30 DIAGNOSIS — O09.91 SUPERVISION OF HIGH RISK PREGNANCY IN FIRST TRIMESTER: Primary | ICD-10-CM

## 2021-04-30 DIAGNOSIS — F41.9 ANXIETY: ICD-10-CM

## 2021-04-30 DIAGNOSIS — J45.30 MILD PERSISTENT ASTHMA WITHOUT COMPLICATION: ICD-10-CM

## 2021-04-30 DIAGNOSIS — O21.9 NAUSEA AND VOMITING DURING PREGNANCY PRIOR TO 22 WEEKS GESTATION: ICD-10-CM

## 2021-04-30 DIAGNOSIS — Z3A.09 9 WEEKS GESTATION OF PREGNANCY: ICD-10-CM

## 2021-04-30 DIAGNOSIS — F17.290 NICOTINE DEPENDENCE DUE TO VAPING TOBACCO PRODUCT: ICD-10-CM

## 2021-04-30 PROBLEM — N93.9 ABNORMAL UTERINE BLEEDING: Status: RESOLVED | Noted: 2017-02-13 | Resolved: 2021-04-30

## 2021-04-30 PROBLEM — O09.90 SUPERVISION OF HIGH-RISK PREGNANCY: Status: ACTIVE | Noted: 2021-04-23

## 2021-04-30 LAB
ALBUMIN UR-MCNC: NEGATIVE MG/DL
APPEARANCE UR: CLEAR
BILIRUB UR QL STRIP: NEGATIVE
COLOR UR AUTO: YELLOW
GLUCOSE UR STRIP-MCNC: NEGATIVE MG/DL
HGB UR QL STRIP: NEGATIVE
KETONES UR STRIP-MCNC: NEGATIVE MG/DL
LEUKOCYTE ESTERASE UR QL STRIP: NEGATIVE
NITRATE UR QL: NEGATIVE
PH UR STRIP: 6 PH (ref 5–7)
SOURCE: NORMAL
SP GR UR STRIP: >1.03 (ref 1–1.03)
UROBILINOGEN UR STRIP-ACNC: 0.2 EU/DL (ref 0.2–1)

## 2021-04-30 PROCEDURE — 81003 URINALYSIS AUTO W/O SCOPE: CPT | Performed by: ADVANCED PRACTICE MIDWIFE

## 2021-04-30 PROCEDURE — 87086 URINE CULTURE/COLONY COUNT: CPT | Performed by: ADVANCED PRACTICE MIDWIFE

## 2021-04-30 PROCEDURE — 99207 PR FIRST OB VISIT: CPT | Performed by: ADVANCED PRACTICE MIDWIFE

## 2021-04-30 ASSESSMENT — MIFFLIN-ST. JEOR: SCORE: 1133.99

## 2021-04-30 NOTE — LETTER
To whom it may concern,    Nehal is currently 9w6d pregnant with an ASHISH of 11/27/2021 and should avoid heavy lifting >25lbs while pregnant. If you any concerns or questions do not hesitate to contact us.                DAPHNE Sprague, CNM

## 2021-04-30 NOTE — PATIENT INSTRUCTIONS
Thank you for coming to see the Midwives at the   [facility]!      Please take prenatal vitamin with DHA and vitamin D3 2,000-3,000 international unit(s) once daily during the entire pregnancy.      We will notify you about your labs that were drawn today once we get the results back.  If you have MyChart your lab results will be posted there.      Someone from the clinic will call you personally or send you a Ploonge message with your results.      If you need any refills of medications please call your pharmacy and they will contact us.      If you have a medical emergency please call 911.      If you have any concerns about today's visit, wish to schedule another appointment, or have an urgent medical concern please call our office at [phone]. You can also make appointments through [employer].      After hours you may also call the clinic number above to be connected with [facility] after hours triage nurse.  The nurse can page the midwife on call if needed. There is always a midwife on call 24 hours a day.    Prenatal Care Recommendations:    Before 14 weeks: Dating ultrasound, genetic testing       This ultrasound helps us determine your dates accurately. Innatal (genetic screening test) can be drawn anytime after 10 weeks of gestation.    16 weeks: Optional genetic testing single AFP       This testing helps understand your baby's risk for some genetic abnormalities.    18-22 weeks:  Screening anatomy ultrasound       This testing will look for early growth abnormalities, placenta location, and may tell the baby's gender if you wish to find out.    24-27 weeks: One hour diabetes test (GCT) and complete blood count       This test helps identify diabetes of pregnancy or gestational diabetes.  We also look   at the iron in your blood and how well your blood clots.    28 - 36 weeks: Tetanus shot (Tdap)       This shot helps protect you and your baby from whooping cough.    36 weeks and  later: Group B Strep test (GBS)       This test helps predict if you need antibiotics in labor to prevent infection for your baby.    Anytime September to April:  Flu shot       This shot helps protect you and your family from the flu.  This is especially important during pregnancy.        The typical schedule after your first visit today you can expect:     Visit 2 - 12-16 weeks  Visit 3 - 20 weeks  Visit 4 - 24 weeks  Visit 5 - 28 weeks  Visit 6 - 30 weeks  Visit 7 - 32 weeks  Visit 8 - 34 weeks  Visit 9 - 36 weeks  Weekly after 36 weeks until delivery.        Any time during or after your pregnancy you may experience increased depression and/or mood changes.    We are here to support you. Please contact us if you are:    Feeling anxious    Overwhelmed or sad     Trouble sleeping    Crying uncontrollably    Trouble caring for yourself or baby.    Any thoughts of hurting yourself, your baby, or anyone else    If anything comes up between your visits or you have concerns please don't hesitate to contact us.    Secure access to your medical record:  Use [employer]pto (secure email communication and access to your chart) to send your primary care provider a message or make an appointment. Ask someone on your Team how to sign up for Apto. To log on to [email or web address] or for more information in Apto please visit the website at [email or web address].       Certified Nurse Midwife (CNM) Team    RANCHO Johnson CNM Melissa Kitzman APRN, CNM, [facility]  DAPHNE Lara DNP, DAPHNE Ibrahim DNP, RANCHO      Again, thank you for choosing the midwives at [facility].  We are excited to be a part of your pregnancy. Please let us know how we can best partner with you to improve your and your family's health.    Remedies for nausea and vomiting with pregnancy      Eat small frequent meals every 2-3 hours if possible.       Avoid food at extremes of temperature and  drinks with carbonation.      Eat foods that appeal to you, avoiding fats and spicy foods.      Avoid liquids with foods.  Drink liquids 30-60 minutes before or after eating and sip slowly.      Bread, pasta, crackers, potatoes, and rice tend to be tolerated the best.      Don't worry about what you eat in the first 3 months, it is more important that you can eat and keep it down.       Try flat ginger ale or ginger tea      Before rising in the morning, eat a small amount of crackers or dry toast.      Peppermint tea      Ginger is a herbal remedy for nausea and you can use it in any form.  There are ginger tablets you can purchase.  The dose 1000 mg a day in divided doses.       You may also try doxylamine (Unisom) 12.5 mg three times a day which is a sleeping medication along with Vitamin B6 25 mg three times a day.  This combination takes up to a week to work so give it some time.       Benadryl (diphenhydramine) 25-50 mg every 8 hour or Dramamine (dimenhydrinate)  mg by mouth every 4-6 hours. Both of these medication may cause some drowsiness      Other things that may help include an Accupressure band and acupuncture      If these methods fail there are many prescriptions that we can try    If you begin to vomit more than 5 or 6 times a day and feel that you are unable to keep anything down, call the [employer] [facility] at [phone]

## 2021-05-01 LAB
BACTERIA SPEC CULT: NO GROWTH
Lab: NORMAL
SPECIMEN SOURCE: NORMAL

## 2021-05-03 DIAGNOSIS — O09.91 SUPERVISION OF HIGH RISK PREGNANCY IN FIRST TRIMESTER: ICD-10-CM

## 2021-05-03 DIAGNOSIS — Z34.01 ENCOUNTER FOR SUPERVISION OF NORMAL FIRST PREGNANCY IN FIRST TRIMESTER: ICD-10-CM

## 2021-05-03 LAB
ERYTHROCYTE [DISTWIDTH] IN BLOOD BY AUTOMATED COUNT: 13.7 % (ref 10–15)
HCT VFR BLD AUTO: 36.3 % (ref 35–47)
HGB BLD-MCNC: 11.7 G/DL (ref 11.7–15.7)
MCH RBC QN AUTO: 29.6 PG (ref 26.5–33)
MCHC RBC AUTO-ENTMCNC: 32.2 G/DL (ref 31.5–36.5)
MCV RBC AUTO: 92 FL (ref 78–100)
PLATELET # BLD AUTO: 201 10E9/L (ref 150–450)
RBC # BLD AUTO: 3.95 10E12/L (ref 3.8–5.2)
WBC # BLD AUTO: 11.2 10E9/L (ref 4–11)

## 2021-05-03 PROCEDURE — 99000 SPECIMEN HANDLING OFFICE-LAB: CPT | Performed by: ADVANCED PRACTICE MIDWIFE

## 2021-05-03 PROCEDURE — 86901 BLOOD TYPING SEROLOGIC RH(D): CPT | Performed by: ADVANCED PRACTICE MIDWIFE

## 2021-05-03 PROCEDURE — 36415 COLL VENOUS BLD VENIPUNCTURE: CPT | Performed by: ADVANCED PRACTICE MIDWIFE

## 2021-05-03 PROCEDURE — 86780 TREPONEMA PALLIDUM: CPT | Mod: 90 | Performed by: ADVANCED PRACTICE MIDWIFE

## 2021-05-03 PROCEDURE — 85027 COMPLETE CBC AUTOMATED: CPT | Performed by: ADVANCED PRACTICE MIDWIFE

## 2021-05-03 PROCEDURE — 99N1100 PR STATISTIC VERIFI PRENATAL TRISOMY 21,18,13: Mod: 90 | Performed by: ADVANCED PRACTICE MIDWIFE

## 2021-05-03 PROCEDURE — 86900 BLOOD TYPING SEROLOGIC ABO: CPT | Performed by: ADVANCED PRACTICE MIDWIFE

## 2021-05-03 PROCEDURE — 86762 RUBELLA ANTIBODY: CPT | Performed by: ADVANCED PRACTICE MIDWIFE

## 2021-05-03 PROCEDURE — 87389 HIV-1 AG W/HIV-1&-2 AB AG IA: CPT | Performed by: ADVANCED PRACTICE MIDWIFE

## 2021-05-03 PROCEDURE — 86850 RBC ANTIBODY SCREEN: CPT | Performed by: ADVANCED PRACTICE MIDWIFE

## 2021-05-03 PROCEDURE — 87340 HEPATITIS B SURFACE AG IA: CPT | Performed by: ADVANCED PRACTICE MIDWIFE

## 2021-05-04 LAB
ABO + RH BLD: NORMAL
ABO + RH BLD: NORMAL
BLD GP AB SCN SERPL QL: NORMAL
BLOOD BANK CMNT PATIENT-IMP: NORMAL
HBV SURFACE AG SERPL QL IA: NONREACTIVE
HIV 1+2 AB+HIV1 P24 AG SERPL QL IA: NONREACTIVE
SPECIMEN EXP DATE BLD: NORMAL
T PALLIDUM AB SER QL: NONREACTIVE

## 2021-05-05 LAB — RUBV IGG SERPL IA-ACNC: 17 IU/ML

## 2021-05-06 ENCOUNTER — MYC MEDICAL ADVICE (OUTPATIENT)
Dept: OBGYN | Facility: CLINIC | Age: 21
End: 2021-05-06

## 2021-05-06 NOTE — LETTER
To Whom It May Concern:    Nehal Dutta is a client in our clinic. She is currently pregnant with an ASHISH of 11/27/2021. Currently she is working 12 hours shifts. For medical reasons, I request that she only be scheduled up to 8 hours per shift. We will continue to evaluate her throughout her pregnancy and update you on increasing her hours per shift, if ever appropriate.     Please contact us with any questions or concerns.     Thank you,  Anirudh Mahoney, SEGUN, APRN, CNM

## 2021-05-07 NOTE — TELEPHONE ENCOUNTER
We Triage -    I wrote a letter for 8 hr work days. Will you please call her and find where it should do?    Thanks  Anirudh

## 2021-05-10 ENCOUNTER — MYC MEDICAL ADVICE (OUTPATIENT)
Dept: MIDWIFE SERVICES | Facility: CLINIC | Age: 21
End: 2021-05-10

## 2021-05-10 ENCOUNTER — TELEPHONE (OUTPATIENT)
Dept: MIDWIFE SERVICES | Facility: CLINIC | Age: 21
End: 2021-05-10

## 2021-05-10 DIAGNOSIS — O21.9 NAUSEA AND VOMITING IN PREGNANCY: ICD-10-CM

## 2021-05-10 LAB — LAB SCANNED RESULT: NORMAL

## 2021-05-10 RX ORDER — ONDANSETRON 4 MG/1
4 TABLET, FILM COATED ORAL EVERY 6 HOURS PRN
Qty: 30 TABLET | Refills: 1 | Status: SHIPPED | OUTPATIENT
Start: 2021-05-10

## 2021-05-10 NOTE — TELEPHONE ENCOUNTER
Pt advised on refill sent. Advised on traveling: hydration, snacks, getting up and moving around frequently to prevent blood clots, support stockings helpful as well.  Pt verbalized understanding, in agreement with plan, and voiced no further questions.  Gabriela Cheung RN on 5/10/2021 at 11:23 AM

## 2021-05-10 NOTE — TELEPHONE ENCOUNTER
Innatal results: Negative  Fetal Fraction: 5 %      TEST RESULT INTERPRETATION   Chromosome 21 No aneuploidy detected  Results consistent with two copies of chromosome 21   Chromosome 18 No aneuploidy detected  Results consistent with two copies of chromosome 18   Chromosome 13 No aneuploidy detected  Results consistent with two copies of chromosome 13   Sex Chromosome No aneuploidy detected  Results consistent with two sex chromosomes:  female     Results received from [facility] in [place] [facility] triage.    Testing done:  Innatal Prenatal Screen    Action:  Spoke to patient and gave NORMAL results and routed to provider.     Gender:  Gender revealed to the patient on the phone. The gender is female    Pt verbalized understanding, in agreement with plan, and voiced no further questions.    Ambar Sterling RN

## 2021-05-10 NOTE — TELEPHONE ENCOUNTER
Pt stating she is continuing to have difficulty with nausea.    After her zofran ran out, she has been using recommendations of VitB6/unisom and dietary modifications with little relief.  Nausea hits her worst in the afternoons and she has difficulty eating and with work.    Also is traveling by car to [place] in 2 weeks and is very concerned about increased nausea during travel and in the increased heat.    Is asking if she can have refill of zofran as it was successful/effective for her.    Routing to provider for review/recommendations.    Ambar Sterling RN on 5/10/2021 at 10:52 AM

## 2021-05-10 NOTE — TELEPHONE ENCOUNTER
Zofran refilled per patient request, looks like there was a refill on there as well. Nausea should hopefully continue to get better and resolve between 12-16 weeks. Travel precautions, frequent breaks from heat, hydration etc.    DAPHNE Sprague, JAHM

## 2021-05-10 NOTE — TELEPHONE ENCOUNTER
Called pt with negative Innatal result, see phone encounter for result details.    Closing encounter.    Ambar Sterling RN on 5/10/2021 at 10:53 AM

## 2021-05-17 ENCOUNTER — PRENATAL OFFICE VISIT (OUTPATIENT)
Dept: MIDWIFE SERVICES | Facility: CLINIC | Age: 21
End: 2021-05-17
Payer: COMMERCIAL

## 2021-05-17 VITALS — SYSTOLIC BLOOD PRESSURE: 120 MMHG | DIASTOLIC BLOOD PRESSURE: 64 MMHG | WEIGHT: 95 LBS | BODY MASS INDEX: 17.66 KG/M2

## 2021-05-17 DIAGNOSIS — O09.91 SUPERVISION OF HIGH RISK PREGNANCY IN FIRST TRIMESTER: Primary | ICD-10-CM

## 2021-05-17 DIAGNOSIS — Z34.01 ENCOUNTER FOR SUPERVISION OF NORMAL FIRST PREGNANCY IN FIRST TRIMESTER: ICD-10-CM

## 2021-05-17 PROCEDURE — 87491 CHLMYD TRACH DNA AMP PROBE: CPT | Performed by: ADVANCED PRACTICE MIDWIFE

## 2021-05-17 PROCEDURE — 99207 PR PRENATAL VISIT: CPT | Performed by: ADVANCED PRACTICE MIDWIFE

## 2021-05-17 PROCEDURE — 87591 N.GONORRHOEAE DNA AMP PROB: CPT | Performed by: ADVANCED PRACTICE MIDWIFE

## 2021-05-17 NOTE — PROGRESS NOTES
Patient feels ok.  Reports mood as stable.  Manager talked with her regarding her hormones and ability to work.  Patient has had nausea and has had vomiting reports about 2-3 times per day..  Has only been taking zofran twice a day  Educated about diet, exercise and normal weight gain.  Happy that she has gained weight  Normal to feel movement between 18-22 weeks  Reviewed labs from 1st OB  GC/Chlamydia urine collection today  Discussed genetic screening; patient has had NIPT  Will begin 81 mg ASA daily  Warning signs discussed  Return to clinic 4 weeks    DAPHNE Humphreys, JAHM

## 2021-05-17 NOTE — PATIENT INSTRUCTIONS
Please begin taking aspirin 81 mg a day.         HEARTBURN PREVENTION    Heartburn can be incredibly uncomfortable and bothersome. It can worsen with pregnancy, certain medications, and other illnesses. Try the lifestyle tips and remedies to combat your symptoms. We encourage you to try these first before using medications.      Small frequent meals      Eat slowly       Separate eating solid foods from liquids by 30-60 minutes      Do not eat within 2 hour of going to sleep      Avoid going to sleep right after eating or prop yourself up on pillows to avoid being completely flat      Avoid acidic foods like tomatoes, spicy foods, caffeine, chocolate, peppermint, alcohol, sugar, white bread rice, and pasta (whole grains are easier to digest)      Chew gum or have a throat lozenge before eating to encourage salivation, saliva neutralizes stomach acid       Avoid tobacco and alcohol      Weight loss may also be helpful if you are overweight      NATURAL REMEDIES FOR HEARTBURN  You should avoid overuse of standard over the counter (OTC) antacids since they neutralize hydrochloric acid and arrest digestion.  Neutralized stomach acids later rebound with an increased acid secretion after the antacid wears off, which in turn produces even more stomach acid.  Prolonged use of antacids can lead to diarrhea and constipation with nausea.  Additionally, brucellae, a beneficial bacteria found in some dairy products, is killed by antacids.  It is better to try these natural remedies first.       Papaya tablets, papaya or papaya leaf tea      Fennel seeds, cumin seeds, anise seeds, and dill seed, can be eaten alone or made into tea      Essential oils to massage into the upper abdomen chamomile, fennel, lemon balm, heber sandalwood                                                      Sip a small amount of apple cider vinegar with warm water      Raw almonds      Drink a glass of milk before bed      Supplement with digestive  enzymes (can be found in most drug/health food) stores    Plain baked potatoes    Comfrey-pepsin tablets (digestive aid)    Eat raw apple peels  Medications    If natural remedies and lifestyle changes fail consider using OTC medications. Many OTC medications are available for heartburn and are often very effective.      Antacids like Tums, Mylanta, Maalox     Zantac/Pepcid    Prilosec     Most OTC medications are recommended for short term use only, if you continue to have issues despite all of these helpful methods we recommend you be seen by a primary care provider for evaluation.    For questions or concerns please call:    [facility]   [phone]

## 2021-05-18 ENCOUNTER — TELEPHONE (OUTPATIENT)
Dept: MIDWIFE SERVICES | Facility: CLINIC | Age: 21
End: 2021-05-18

## 2021-05-18 DIAGNOSIS — A74.9 CHLAMYDIA INFECTION AFFECTING PREGNANCY IN FIRST TRIMESTER: Primary | ICD-10-CM

## 2021-05-18 DIAGNOSIS — O98.811 CHLAMYDIA INFECTION AFFECTING PREGNANCY IN FIRST TRIMESTER: Primary | ICD-10-CM

## 2021-05-18 LAB
C TRACH DNA SPEC QL NAA+PROBE: POSITIVE
N GONORRHOEA DNA SPEC QL NAA+PROBE: NEGATIVE
SPECIMEN SOURCE: ABNORMAL
SPECIMEN SOURCE: NORMAL

## 2021-05-18 RX ORDER — AZITHROMYCIN 500 MG/1
1000 TABLET, FILM COATED ORAL DAILY
Qty: 2 TABLET | Refills: 0 | Status: SHIPPED | OUTPATIENT
Start: 2021-05-18 | End: 2021-05-19

## 2021-05-18 NOTE — TELEPHONE ENCOUNTER
+ Chlamydia result   Taken 1318 from [facility] lab    Routing high priority to midwife to notify.    Ambar Sterling RN on 5/18/2021 at 1:19 PM

## 2021-05-18 NOTE — RESULT ENCOUNTER NOTE
I spoke with Nehal, she was at work and prefers call back at 1-2 pm, results NOT GIVEN.    Ava Pinto, DAPHNE TYSONM

## 2021-05-18 NOTE — TELEPHONE ENCOUNTER
Called Nehal to discussed positive chlamydia at her requested time over lunch from work. We reviewed treatment, she reports 2-3 lifetime partners and is unsure who gave it to her, after discussion she is fairly certain and it was before FOB. They are not currently together, plans to discuss with him tonight. We reviewed we recommend partner treatment as well, he may choose to get tested himself and seek other treatment or we can also send. Reviewed one dose of treatment x1, plan for SHAE at next visit. No symptoms usually and no long term affects when treated. STD reported. Patient to call back with any questions or concerns. RX sent to pharmacy. Use condoms until SHAE completed.    DAPHNE Sprague, CNM

## 2021-05-20 ENCOUNTER — PATIENT OUTREACH (OUTPATIENT)
Dept: CARE COORDINATION | Facility: CLINIC | Age: 21
End: 2021-05-20

## 2021-05-20 NOTE — PROGRESS NOTES
Clinic Care Coordination Contact  [facility]/Voicemail       Clinical Data: Care Coordinator Outreach    Outreach attempted x 1.  Left message on patient's voicemail with call back information and requested return call.    Plan: Care Coordinator will try to reach patient again in 3-5 business days.    Navya Torres [facility]  Clinic Care Coordinator  [facility] Diann Kershaw  [facility]  [phone]  [email or web address]

## 2021-05-26 ENCOUNTER — MYC MEDICAL ADVICE (OUTPATIENT)
Dept: OBGYN | Facility: CLINIC | Age: 21
End: 2021-05-26

## 2021-05-27 NOTE — TELEPHONE ENCOUNTER
Attempted to call pt but unable to leave vm as mailbox full.    Sent a mychart response encouraging to take PCN as directed.    Gabriela Cheung RN on 5/27/2021 at 10:03 AM

## 2021-05-29 ENCOUNTER — HEALTH MAINTENANCE LETTER (OUTPATIENT)
Age: 21
End: 2021-05-29

## 2021-06-03 NOTE — PROGRESS NOTES
Clinic Care Coordination Contact  [facility]/Voicemail       Clinical Data: Care Coordinator Outreach    Outreach attempted x 2.  Left message on patient's voicemail with call back information and requested return call.    Plan: Care Coordinator will try to reach patient again in 1 month.    Navya Torres [facility]  Clinic Care Coordinator  [facility] Diann Sequoyah  [facility]  [phone]  [email or web address]

## 2021-06-14 ENCOUNTER — PRENATAL OFFICE VISIT (OUTPATIENT)
Dept: MIDWIFE SERVICES | Facility: CLINIC | Age: 21
End: 2021-06-14
Payer: MEDICAID

## 2021-06-14 VITALS — WEIGHT: 97 LBS | SYSTOLIC BLOOD PRESSURE: 102 MMHG | BODY MASS INDEX: 18.03 KG/M2 | DIASTOLIC BLOOD PRESSURE: 50 MMHG

## 2021-06-14 DIAGNOSIS — O09.92 SUPERVISION OF HIGH RISK PREGNANCY IN SECOND TRIMESTER: Primary | ICD-10-CM

## 2021-06-14 DIAGNOSIS — K21.9 GASTROESOPHAGEAL REFLUX DISEASE, UNSPECIFIED WHETHER ESOPHAGITIS PRESENT: ICD-10-CM

## 2021-06-14 DIAGNOSIS — O98.311 CHLAMYDIA TRACHOMATIS INFECTION IN MOTHER DURING FIRST TRIMESTER OF PREGNANCY: ICD-10-CM

## 2021-06-14 DIAGNOSIS — A56.8 CHLAMYDIA TRACHOMATIS INFECTION IN MOTHER DURING FIRST TRIMESTER OF PREGNANCY: ICD-10-CM

## 2021-06-14 PROCEDURE — 99207 PR PRENATAL VISIT: CPT | Performed by: ADVANCED PRACTICE MIDWIFE

## 2021-06-14 PROCEDURE — 87491 CHLMYD TRACH DNA AMP PROBE: CPT | Performed by: ADVANCED PRACTICE MIDWIFE

## 2021-06-14 RX ORDER — FAMOTIDINE 20 MG/1
20 TABLET, FILM COATED ORAL 2 TIMES DAILY
Qty: 180 TABLET | Refills: 0 | Status: SHIPPED | OUTPATIENT
Start: 2021-06-14 | End: 2021-09-16

## 2021-06-14 NOTE — PROGRESS NOTES
Continues to have nausea.  Not taking zofran as directed.  Throws up 2-3 times a day and complains of a sore throat.   Encouraged saline gargles. The most important thing is to control her nausea and vomiting by taking her medications as directed.   Will also prescribe for her GERD.   Complains of the baby being in her ribs and causing pressure at night.  Recommended to elevate HOB.   Complains of lightening crotch.   Doesn't have insurance.  Was in [place] recently and couldn't complete it when she was there.   Fetal movement Yes  Denies loss of fluid/vb/contractions/pelvic pain  Depression screening done  declined AFP today due to insurance.   Chlamydia positive at last visit  SHAE today  Anatomy ultrasound next visit between 18-22 weeks  Return to clinic 4 weeks    Constanza Romero, DAPHNE, JAHM

## 2021-06-14 NOTE — PATIENT INSTRUCTIONS
Constipation  What is constipation?   Constipation means that stools are difficult or painful to pass and less frequent than usual.   A child with constipation feels a strong urge to have a stool and has discomfort in the anal area, but is unable to pass a stool after straining and pushing for more than 10 minutes.   After 4 weeks or so of life, some breast-fed babies pass normal, large, soft stools at infrequent intervals (up to 7 days is not abnormal) without pain. For older children, going 3 or more days without a stool can be considered constipation, even though this may cause no pain in some children and even be normal for a few.   Common Misconceptions About Constipation   Some people normally have hard stools daily without any pain. Children who eat a lot of food pass extremely large stools. Babies less than 6 months of age commonly grunt, push, strain, draw up the legs, and become flushed in the face during passage of stools. However, they usually don't cry. These behaviors are normal since it is difficult to produce a stool while lying down.   What is the cause?   Constipation is often due to a diet that does not include enough fiber. Drinking or eating too many milk products can cause constipation for many people. It may also be caused by repeatedly waiting too long to go to the bathroom, not drinking enough liquids, or not getting enough exercise. The memory of painful passage of stools can make young children hold back. If constipation begins during toilet training, usually the child is strong-willed and the parent is putting to much pressure on the child about using the toilet.   How long will it last?   Changes in the diet usually relieve constipation. After your child is better, be sure to keep him on a nonconstipating diet so that it doesn't happen again.   Sometimes the trauma to the anal canal during constipation causes an anal fissure (a small tear). If your child has an anal  fissure, you may find small amounts of bright red blood on the toilet tissue or the stool surface.   How can I take care of my child?   Diet treatment for infants less than 1 year old Give fruit juices (such as apple or pear juice) twice a day to babies over 2?months old. Switching to soy formula may also result in looser stools. If your baby is over 4?months old, add strained foods with a high fiber content such as cereals, apricots, prunes, peaches, pears, plums, beans, peas, or spinach twice a day. Strained bananas and apples are also helpful.   Diet treatment for older children over 1 year old   Make sure that your child eats fruits or vegetables at least 3 times a day. Some examples are prunes, figs, dates, raisins, bananas, apples, peaches, pears, apricots, beans, peas, cauliflower, broccoli, and cabbage. Warning: Avoid any foods your child can't chew easily and might choke on.   Increase bran. Bran is a natural stool softener because it has a high fiber content. Make sure that your child's daily diet includes a source of bran, such as one of the whole grain cereals, unmilled bran, bran muffins, carlo crackers, oatmeal, high-fiber cookies, brown rice, or whole wheat bread. Popcorn is one of the best high-fiber foods for children over 4?years old.   Decrease the amount of constipating foods in your child's diet to 3 servings per day. Examples of constipating foods are cow's milk, ice cream, cheese, and yogurt.   Increase the amount of pure fruit juice your child drinks. (Orange juice will not help constipation as well as other juices).   Sitting on the toilet (children who are toilet trained) Encourage your child to establish a regular bowel pattern by sitting on the toilet for 10 minutes after meals, especially after breakfast. Some children and adults repeatedly get blocked up if they don't have regular sit times. If your child is resisting toilet training by holding back, stop the toilet training for a  while and put him back in diapers or pull-ups.   Flexed position Help your baby by holding the knees against the chest to simulate squatting (the natural position for pushing out a stool). It's difficult to have a stool while lying down. Gently pumping the lower abdomen may also help.   Stool softeners If a change in diet doesn't relieve the constipation and your child is over 1 year old, give a stool softener with dinner every night for one week. Stool softeners are not habit forming. They work 8 to 12?hours after they are taken. Examples of stool softeners that you can buy without a prescription are Miralax, Metamucil, Citrucel, milk of magnesia, and mineral oil. Give 1/2 to 1?tablespoon daily.   Common mistakes in treating constipation Don't use any suppositories or enemas without your healthcare provider's advice. These can irritate the anus, resulting in pain and stool holding. Do not give your child laxatives such as products that contain senna without consulting your healthcare provider because they can cause cramps.   Relieving rectal pain If your child is very constipated and has rectal pain needing immediate relief, one of the following will usually provide quick relief:   sitting in a warm bath to relax the muscle around the anus (anal sphincter)   placing a warm wet cotton ball on the anus and moving it to stimulate the rectal muscle   giving your child a glycerin suppository (through the anus)   If your child is still blocked up after trying this advice, talk to your healthcare provider now about being seen or using an enema.   When should I call my child's healthcare provider?   Call IMMEDIATELY if:   Your child develops severe rectal or abdominal pain.   Call during office hours If:   Your child does not have a stool after 3?days on the nonconstipating diet.   You are using suppositories or enemas.   You have other concerns or questions.     Published by [employer].  This content is reviewed  "periodically and is subject to change as new health information becomes available. The information is intended to inform and educate and is not a replacement for medical evaluation, advice, diagnosis or treatment by a healthcare professional.   Written by RONALD Casas MD, author of \"Your Child's Health,\" [employer].   ? 2010 [facility] and/or its affiliates. All Rights Reserved.               "

## 2021-06-16 LAB
C TRACH DNA SPEC QL NAA+PROBE: NEGATIVE
SPECIMEN SOURCE: NORMAL

## 2021-06-16 NOTE — RESULT ENCOUNTER NOTE
Informed via [employer].     Alyse SERNA CNM, [facility]  [phone]

## 2021-06-18 ENCOUNTER — TELEPHONE (OUTPATIENT)
Dept: MIDWIFE SERVICES | Facility: CLINIC | Age: 21
End: 2021-06-18

## 2021-06-18 ENCOUNTER — OFFICE VISIT (OUTPATIENT)
Dept: MIDWIFE SERVICES | Facility: CLINIC | Age: 21
End: 2021-06-18

## 2021-06-18 VITALS — BODY MASS INDEX: 18.03 KG/M2 | DIASTOLIC BLOOD PRESSURE: 60 MMHG | SYSTOLIC BLOOD PRESSURE: 122 MMHG | WEIGHT: 97 LBS

## 2021-06-18 DIAGNOSIS — O09.92 SUPERVISION OF HIGH RISK PREGNANCY IN SECOND TRIMESTER: Primary | ICD-10-CM
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
## 2021-06-18 PROCEDURE — 99207 PR PRENATAL VISIT: CPT | Performed by: ADVANCED PRACTICE MIDWIFE

## 2021-06-18 RX ORDER — CEPHALEXIN 500 MG/1
500 CAPSULE ORAL
COMMUNITY
Start: 2021-06-17 | End: 2021-06-24

## 2021-06-18 NOTE — LETTER
To whom it may concern,      Nehal was seen in our clinic this afternoon to assess her baby after slip and fall at work on water puddle. Fetal heart tones were heard and were normal, bruising noted on right elbow and she complains of right hip pain/mild cramping. She will continue to monitor for worsening cramping bleeding. Nehal is okay to return to work tomorrow, please call us with any further questions or concerns.          DAPHNE Sprague, CNM

## 2021-06-18 NOTE — PROGRESS NOTES
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 Happy to hear FHTs, very relieved and tearful.   Some mild cramping on right side where she fell, did not fell on belly. Denies bleeding/LOF.  May need some paperwork done for work, given fax number and letter for work stating that she was seen today.   Blood type A+  F/U with regular prenatal care appt.    DAPHNE Sprague, JAHM

## 2021-06-18 NOTE — TELEPHONE ENCOUNTER
16w6d    Pt called while at work; an hour ago she fell pretty hard after slipping; landed on her right side, hitting hip pretty hard and upper arm w residual discomfort/markings   Denies contractions/cramping; Denies LOF/VB  Feels fine OB wise  Tearful and concerned about her baby. Unsure if she hit her belly.  Employer made her complete a bunch of paper work and they want her evaluated and complete paperwork by her midwife.  Not appropriate for MAC monitoring.    No midwife apts left for the day. Contacted SHEILA Pinto CNM and will see pt at 1600 for reassurance and complete the recommended eval and paper work from pt's employer.    Pt will be here by 1600 for eval.    Gabriela Cheung RN on 6/18/2021 at 3:38 PM

## 2021-07-06 ENCOUNTER — PATIENT OUTREACH (OUTPATIENT)
Dept: NURSING | Facility: CLINIC | Age: 21
End: 2021-07-06
Payer: MEDICAID

## 2021-07-06 ASSESSMENT — ACTIVITIES OF DAILY LIVING (ADL): DEPENDENT_IADLS:: INDEPENDENT

## 2021-07-06 NOTE — PROGRESS NOTES
Clinic Care Coordination Contact    Follow Up Progress Note      Assessment: GILMA CARPIO spoke with pt regarding her overall wellbeing. Pt shared that she is doing well. She is healing from her fall last month and is no longer experiencing nausea.     Pt shared that she sent in paperwork a week ago for Medical Assistance. She has not heard anything yet. GILMA CARPIO encouraged her to follow up with the [facility] to ensure they received the application and it is processing. GILMA CARPIO provided the phone number. Pt shared that she has received bills from [facility], GILMA CARPIO encouraged her to hold off on paying these as MA will cover them.     Pt shared that she thinks she has felt baby kicking. Pt had some questions about this. Pt also discussed that she is concerned that she will not be able to carry to full-term because of her small stature. GILMA CARPIO encouraged her to speak with midwives about it there is a correlation for this but it is unlikely.    Goals addressed this encounter:   Goals Addressed                 This Visit's Progress       Patient Stated      1. Psychosocial (pt-stated)   40%     Goal Statement: Before due date in November, I would like to explore the options for my future to support myself and my baby after birth to ensure our overall health and wellbeing.  Date Goal set: 4/16/2021  Barriers: None  Strengths: Strong support system, very motivated to ensure my wellbeing and what is best for baby  Date to Achieve By: 11/1/2021  Patient expressed understanding of goal: yes  Action steps to achieve this goal:  1. I will explore ways to manage my anxiety  2. I will present all questions related to concerns for pregnancy or baby to midwives or care team  3. I will outreach to Care Coordination  for further questions or concerns         Outreach Frequency: monthly    Plan: GILMA CARPIO will outreach in 1 month to ensure her MA has started.    Navya Torres, [facility]  Clinic Care Coordinator  [facility] Bettina ALTAMIRANO  [facility] Bettina ALTAMIRANO [facility] Diann Coosa  M [facility]  [phone]  [email or web address]

## 2021-07-12 NOTE — PROGRESS NOTES
"Here with Jarred today  Symptoms of GERD/heartburn are \"fine.\" Never picked up the prescription for Pepcid    Taking Zofran daily. When off of them \" feel super nauseous throughout the day.\" Still vomits 2-3 times a day \"within a sitting\" if she does not take the Zofran daily. Usually just takes a dose in the morning before work.     Only used Zoloft for the first week after it was prescribed. States Mom was on Zoloft at one point and when she came off it she developed thoughts of suicide and depression. Nehal is worried that will happen to her.   \"Did not really need them that bad.\" \"Feels a lot better than I used too.\"    Talked with BALAJI Cason on 7/6/21  Fetal movement: positive   Denies loss of fluid/vb/contractions  Has more vaginal discharge throughout the day.   Recently felt like she was \"peeing, running down my leg.\" Not experienced since last week.   Warning s/sx discussed and when to call.   Anatomy ultrasound results discussed; to be reviewed by  MD, having a Boy, Placenta: posterior  GCT visit between 24-28 weeks, handout provided, reminded of longer appointment  Round ligament pain and comfort measures reviewed.     Return to clinic 4 weeks    Angelica SERNA CNM    " serur

## 2021-07-12 NOTE — PATIENT INSTRUCTIONS
Round Ligament Pain    Pregnancy can entail many normal discomforts. One of those discomforts may be round ligament pain. Round ligament pain occurs as your uterus and your baby grow and the muscles begin to stretch more in your abdomen. Round ligament pain is normal and not dangerous but can be very uncomfortable      Round ligament pain is typically described as an unpleasant sensation that ranges from a sharp knifelike pain to dull intermittent pain in the lower abdominal/suprapubic area of a pregnant woman      Virtually all pregnant women will experience this pain at some point during their pregnancies      It typically manifests between 16 and 20 weeks of pregnancy and can be incredibly bothersome especially for women who remain very active during their pregnancies       Please discuss with your midwife if you are having this type of pain; sometimes the pain can be associated with other medical conditions so it is important for us to assess you just to make sure    Comfort measures    There are certain things you can do to cope with round ligament pain and ease the discomfort you are having      Pregnancy support belt      Tylenol      Hot/ice packs      Baths       Exercise such as yoga and swimming that help stretch muscles      Prenatal massage      Reflexology to waist and pelvic joints       Positioning such as side-lying and hands and knees, make sure your abdomen is  well supported with pillows while doing different positions      Calcium Rich Foods    All premenopausal or women of child-bearing age need to get at least 1000 mg of calcium per day from diet and/or supplementation. Post menopausal women should get at least 1200 mg from diet and/or supplementation.    Food     Amount   Calcium (mg)  Dairy  Yogurt     1 cup   400   Ice Cream/Frozen yogurt 1/2 cup  100  Milk 1% or 2%   1 cup   300  Cheese   1 oz    195-335   Cottage cheese 2%  1 cup   155  Fruits  Orange   1 medium  60  Pear    1  medium  19  Raisins    1/4 cup  18  Vegetables-fresh and/or cooked  Broccoli   1/2 cup  36  Bok Guzman   1/2 cup  79  Stefani greens   1/2 cup  15  Carrots    1 medium  19  Iceberg lettuce  4 leaves  16  Nuts, Beans, Seeds  Canned baked beans   1/2 cup  100  Canned red kidney beans 1/2 cup  25-80  Canned navy beans  1/2 cup  64  Tofu with calcium sulfate  1/2 cup  150  Soybeans   1 cup   175  Soy milk    1 cup   10  Almonds    1/4 cup  74  Protein  [place]   3 oz   181  Tuna, canned   3 oz   10  Turkey breast   3.5 oz   10  Egg (large)   1 egg   27  Peanut Butter   2 tbsp   11  Beef     3 oz   9  Chicken   1 leg   15  Grain  Amaranth (uncooked)  1 cup   298  Corn tortilla    1 medium  42  Rice (cooked)   1/2 cup  20  Waffle (frozen ~7in)  1   179  Bagel    1   23  Calcium fortified foods/drinks  Orange juice   1 cup   300  Cereal + milk   3/4 cup + 1/2 cup 400  Rice milk   1 cup   300  Lactaid milk    1 cup         GESTATIONAL DIABETES SCREENING    All pregnant women are screened at least once for diabetes as part of their prenatal care. A woman has gestational diabetes if she has high blood sugars for the first time during pregnancy.      Diabetes can harm your health and the health of your baby.  But if we find the diabetes early in pregnancy we will watch your health closely and prevent further problems.       We will check for gestational diabetes during your visit between 24-28 weeks visit. Please note you can not do this prior to 24 weeks of gestation.      Plan to spend about an hour at the clinic.  When you check in let us know that you will be having your diabetes screening that day.       We will give you a 50 gram glucose drink that you have 5 minutes to consume.  Exactly one hour later you will have draw blood from your arm to check your blood sugar level.      We will call you to let you know if your results are normal.  If the results are normal no more testing will be needed.  If your results are not  normal we will discuss follow up testing with you.        You may eat prior to the testing but it is not recommended to eat or drink very sweet things such as pop, juice, candy or dessert type foods.  Eat a high protein, low carb meals prior to testing.    If you have any questions please call:    [facility]    [phone]

## 2021-07-14 PROBLEM — R63.6 UNDERWEIGHT: Status: ACTIVE | Noted: 2021-06-17

## 2021-07-15 ENCOUNTER — ANCILLARY PROCEDURE (OUTPATIENT)
Dept: ULTRASOUND IMAGING | Facility: CLINIC | Age: 21
End: 2021-07-15
Attending: ADVANCED PRACTICE MIDWIFE
Payer: MEDICAID

## 2021-07-15 ENCOUNTER — PRENATAL OFFICE VISIT (OUTPATIENT)
Dept: MIDWIFE SERVICES | Facility: CLINIC | Age: 21
End: 2021-07-15
Attending: ADVANCED PRACTICE MIDWIFE

## 2021-07-15 VITALS — WEIGHT: 102 LBS | BODY MASS INDEX: 18.96 KG/M2 | SYSTOLIC BLOOD PRESSURE: 112 MMHG | DIASTOLIC BLOOD PRESSURE: 58 MMHG

## 2021-07-15 DIAGNOSIS — Z3A.20 20 WEEKS GESTATION OF PREGNANCY: ICD-10-CM

## 2021-07-15 DIAGNOSIS — F41.9 ANXIETY IN PREGNANCY IN SECOND TRIMESTER, ANTEPARTUM: Primary | ICD-10-CM

## 2021-07-15 DIAGNOSIS — F32.A DEPRESSION DURING PREGNANCY IN SECOND TRIMESTER: ICD-10-CM

## 2021-07-15 DIAGNOSIS — O99.342 ANXIETY IN PREGNANCY IN SECOND TRIMESTER, ANTEPARTUM: Primary | ICD-10-CM

## 2021-07-15 DIAGNOSIS — O99.342 DEPRESSION DURING PREGNANCY IN SECOND TRIMESTER: ICD-10-CM

## 2021-07-15 DIAGNOSIS — O09.92 SUPERVISION OF HIGH RISK PREGNANCY IN SECOND TRIMESTER: ICD-10-CM

## 2021-07-15 DIAGNOSIS — O09.91 SUPERVISION OF HIGH RISK PREGNANCY IN FIRST TRIMESTER: ICD-10-CM

## 2021-07-15 PROCEDURE — 99207 PR PRENATAL VISIT: CPT | Performed by: ADVANCED PRACTICE MIDWIFE

## 2021-07-15 PROCEDURE — 76805 OB US >/= 14 WKS SNGL FETUS: CPT | Performed by: OBSTETRICS & GYNECOLOGY

## 2021-07-23 ENCOUNTER — TELEPHONE (OUTPATIENT)
Dept: MIDWIFE SERVICES | Facility: CLINIC | Age: 21
End: 2021-07-23

## 2021-07-23 DIAGNOSIS — O26.86 PUPP (PRURITIC URTICARIAL PAPULES AND PLAQUES OF PREGNANCY): Primary | ICD-10-CM

## 2021-07-23 RX ORDER — TRIAMCINOLONE ACETONIDE 1 MG/G
CREAM TOPICAL 2 TIMES DAILY PRN
Qty: 45 G | Refills: 1 | Status: SHIPPED | OUTPATIENT
Start: 2021-07-23

## 2021-07-23 NOTE — TELEPHONE ENCOUNTER
Agree with RN, sounds like PUPPS. Most common place for PUPPS to start is on abdomen, may spread to extremities, chest and back. Usually the palms of hands, soles of feet and face are not impacted. I will send in a prescription for triamcinolone cream to use once to twice daily. She can take benadryl 25-50 mg every 4-6 hours as needed as well. She should make sure to apply lotion (scent free, something like Vanicream or Aveeno) to the area, keeping the area well moisturized may help. She should schedule an OV next week to assess. If she were to start having strong itching on palms of hands, soles of feet or on her face and there is NO rash then she should call CNM to further workup.     DAPHNE Mccauley, JAHM

## 2021-07-23 NOTE — TELEPHONE ENCOUNTER
Pt calling with concerns of a rash.    3-4 days started itching on her belly on the right side - the side that the baby is on.  Looks like little mosquito bites or pimples  It very itchy and painful.  Is very red in one area, the size of a mireya.    Has been putting aloe, coritsone cream, will work for 5 mins and then start itching again right away.  Encouraged pt to take benadryl.     Discussed possible PUPPS    Routing to provider for review/additional recommendations.    Would you like an OV as well to confirm if PUPPS?    Ambar Sterling RN on 7/23/2021 at 3:11 PM

## 2021-07-23 NOTE — TELEPHONE ENCOUNTER
Patient was notified on PHI of recommendations, prescription at pharmacy and to schedule next week for follow up.  Asked that she call back if she has further questions.

## 2021-07-24 ENCOUNTER — NURSE TRIAGE (OUTPATIENT)
Dept: NURSING | Facility: CLINIC | Age: 21
End: 2021-07-24

## 2021-07-24 ENCOUNTER — TELEPHONE (OUTPATIENT)
Dept: MIDWIFE SERVICES | Facility: CLINIC | Age: 21
End: 2021-07-24

## 2021-07-24 NOTE — TELEPHONE ENCOUNTER
Nehal fell yesterday around 10 pm after possibly due to fainting. Feels like it was a combination of things  yesterday, recently lost a family member, low appetite, limited hydration, poor sleep, and long work day. Fell on tailbone which is sore but denies bleeding, leakage of fluid, cramping, and pain. Reports good fetal movement and reassured right after fall as baby was active. She states she didn't know she could call after hours or that there was a triage are she could be seen in at the hospital outside of ED. She feels completely fine today.  We discussed phone numbers to call with concerns is the same during and after business hours, discussed warning signs to watch for after fall. Reviewed possible causes of fainting being low blood sugar, stress, dehydration, exhaustion. Recommend 300 plus calories per day and protein shake given her low starting BMI, recommend frequent breaks at working and rest, sleep hygiene/goodnights rest, limiting stress, and increasing hydration to 800-100 oz of water per day, avoiding heat. If problems persists would recommend further evaluation. Next prenatal due week of 8/9    DAPHNE Sprague, CNM

## 2021-07-24 NOTE — TELEPHONE ENCOUNTER
"22 wks pregnant. Patient states that last night, while standing in her kitchen, all of a sudden her hearing went out, she had loud ringing in her ears, blurry vision, and felt dizzy. Roommate told her that she rolled her eyes, and then fell to the floor landing on her buttocks. She had not eaten much yesterday, poor sleep the night before (2hrs), and had worked 13 hrs. \" It was hot. After I was given water and toast, everything came back to normal.\" No fever noted.   It has not happened since. She states she feels perfectly fine now. Her tailbone is sore =\"2\". Baby has been actively kicking, no problem with pregnancy noted.    OBGYN: BRAN Shaffer. Midwives @ [facility]..   Triaged to a disposition of Go to ED. On call Midwife will be consulted.  Nicole Pinto CNM on call paged via [employer] 3:30pm. She has contacted patient and given patient instructions. (Home Care for now).    Geno Griffith RN Triage Nurse Advisor 3:54 PM 7/24/2021    Reason for Disposition    Pregnant or possibly pregnant    Additional Information    Negative: Difficult to awaken or acting confused (e.g., disoriented, slurred speech)    Negative: Still unconscious    Negative: Shock suspected (e.g., cold/pale/clammy skin, too weak to stand, low BP, rapid pulse)    Negative: Difficulty breathing    Negative: Bluish (or gray) lips or face now    Negative: Chest pain    Negative: Extra heart beats or heart is beating fast  (i.e.,\"palpitations\")    Negative: Bleeding (e.g., vomiting blood, rectal bleeding or tarry stools, severe vaginal bleeding)(Exception: fainted from sight of small amount of blood; small cut or abrasion)    Negative: Fainted suddenly after medicine, allergic food or bee sting    Negative: Age > 50 years (Exception: occurred > 1 hour ago AND now feels completely fine)    Negative: History of heart problems (e.g., congestive heart failure, heart attack)    Negative: [1] Fainted > 15 minutes ago AND [2] still feels too weak " or dizzy to stand    Negative: Sounds like a life-threatening emergency to the triager    Negative: [1] Has diabetes (diabetes mellitus) AND [2] fainting from low blood sugar (i.e., < 70 mg/dl or 3.9 mmol/l)    Negative: Seizure suspected (e.g., muscle jerking or shaking followed by confusion)    Negative: Fever > 104 F (40.0 C)    Negative: Unconscious or difficult to awaken    Negative: Acting confused (e.g., disoriented, slurred speech)    Negative: Seizure has occurred    Negative: Very weak (e.g., can't stand)    Negative: Has fainted (passed out)    Negative: Sounds like a life-threatening emergency to the triager    Negative: [1] Swelling of both ankles (i.e., pedal edema) AND [2] caused by hot weather    Negative: Unable to walk, or can only walk with assistance (e.g., requires support)    Negative: Fever > 103 F (39.4 C)    Negative: [1] Dizziness or weakness AND [2] persists after 2 hours of rest and drinking liquids    Negative: [1] Fever AND [2] persists after 2 hours of rest and drinking liquids    Negative: Vomiting interferes with drinking fluids    Negative: Patient sounds very sick or weak to the  triager    Negative: [1] Painful muscle cramps AND [2] not resolved after 4 hours of rest and drinking liquids    Negative: [1] Fever > 101 F (38.3 C) AND [2] age > 60    Negative: [1] Fever > 100.0 F (37.8 C) AND [2] bedridden (e.g., nursing home patient, CVA, chronic illness, recovering from surgery)    Negative: [1] Fever > 100.0 F (37.8 C) AND [2] diabetes mellitus or weak immune system (e.g., HIV positive, cancer chemo, splenectomy, organ transplant, chronic steroids)    Negative: Heat exhaustion suspected (i.e., dehydration from heat exposure)    Negative: [1] Fainted > 15 minutes ago AND [2] still looks pale (pale skin, pallor)    Negative: [1] Fainted > 15 minutes ago AND [2] still feels weak or dizzy    Negative: Occurred during exercise    Negative: Any head or face injury    Protocols used:  [id number], HEAT EXPOSURE (HEAT EXHAUSTION AND HEAT STROKE)-A-AH

## 2021-08-04 ENCOUNTER — NURSE TRIAGE (OUTPATIENT)
Dept: NURSING | Facility: CLINIC | Age: 21
End: 2021-08-04

## 2021-08-04 ENCOUNTER — TELEPHONE (OUTPATIENT)
Dept: MIDWIFE SERVICES | Facility: CLINIC | Age: 21
End: 2021-08-04

## 2021-08-04 NOTE — TELEPHONE ENCOUNTER
"Was seen at 3 different clinics before being seen in the Urgent Care, [facility] in [place]. She feels no one is able to do anything for her.   Her main concern today is if she can take Prednisone for her asthma. \"It has gotten so bad.\"  She reports she was given nebulizer's while in Urgent care and more neb solution to use when at home. Has also been using Tylenol, but unsure of how much she can take. Has been taking 500mg    Her main symptom is feeling short of breath. She has been tested for COVID twice in the past week, both results were negative. She was told flu screening was not available to her at this time. She is unsure if this is just a cold, COVID, or the flu. The MD in Urgent Care told her it could be something just viral.     Nehal has been out of work for the past week and has a work letter good through Friday. Her employer wants her to return tomorrow or Friday.  Even though she feels miserable and does not want to work she states she is the type of person to push through and she financially needs to work. She has been taking her work situation day-by-day.     Denies vaginal bleeding or leading of fluid. Has been having some abdominal cramping. Feels baby move a lot, especially when using her nebulizer.   Main symptom is feeling short of breath and having no energy. Has been able to drink Gatorade and water throughout the day but not eating much food.     She made an appt in [employer] for 1300 today with a FP provider to F/U.   Encouraged to keep that appt.     Discussed continuing to use nebulizers and inhalers as directed and if prednisone was prescribed, it is safe to do . Discussed risks and benefits of treating asthma in pregnancy.     Encouraged to continue resting, drinking and eating small amounts of bland food, when able. Tylenol (no more than 3gms in 24hr), warm bath/shower, and heating pad for comfort.     Make next prenatal visit for next week. Can screen for GDM at next visit or " wait until 28 week visit.     Offered a work letter through Tuesday or through Saturday. Nehal opts to continue to go day-by-day determining when to return to work. She will let us know if she needs another work letter.     Angelica SERNA CNM    Call lasted 22 min

## 2021-08-04 NOTE — TELEPHONE ENCOUNTER
Asthma has been bad past four days. Hospitalized a couple of days ago. Is it safe to take prednisone to open lungs while pregnant? Please call patient at:  [phone]. 23 wk, 4 days pregnant and sees midwives.  Aracely Santa RN  [facility]    Reason for Disposition    Caller has urgent medication question about med that PCP prescribed and triager unable to answer question    Additional Information    Negative: Drug overdose and triager unable to answer question    Negative: Caller requesting information unrelated to medicine    Negative: Caller requesting a prescription for Strep throat and has a positive culture result    Negative: Rash while taking a medication or within 3 days of stopping it    Negative: Immunization reaction suspected    Negative: Asthma and having symptoms of asthma (cough, wheezing, etc.)    Negative: Breastfeeding questions about mother's medicines and diet    Negative: MORE THAN A DOUBLE DOSE of a prescription or over-the-counter (OTC) drug    Negative: DOUBLE DOSE (an extra dose or lesser amount) of over-the-counter (OTC) drug and any symptoms (e.g., dizziness, nausea, pain, sleepiness)    Negative: DOUBLE DOSE (an extra dose or lesser amount) of prescription drug and any symptoms (e.g., dizziness, nausea, pain, sleepiness)    Negative: Took another person's prescription drug    Negative: DOUBLE DOSE (an extra dose or lesser amount) of prescription drug and NO symptoms (Exception: a double dose of antibiotics)    Negative: Diabetes drug error or overdose (e.g., took wrong type of insulin or took extra dose)    Negative: Caller has medication question about med not prescribed by PCP and triager unable to answer question (e.g., compatibility with other med, storage)    Negative: Pharmacy calling with prescription questions and triager unable to answer question    Negative: Prescription not at pharmacy and was prescribed today by PCP    Negative: Request for URGENT new  prescription or refill of 'essential' medication (i.e., likelihood of harm to patient if not taken) and triager unable to fill per department policy    Protocols used: MEDICATION QUESTION CALL-A-[place]

## 2021-08-06 ENCOUNTER — NURSE TRIAGE (OUTPATIENT)
Dept: NURSING | Facility: CLINIC | Age: 21
End: 2021-08-06

## 2021-08-06 ENCOUNTER — PRENATAL OFFICE VISIT (OUTPATIENT)
Dept: MIDWIFE SERVICES | Facility: CLINIC | Age: 21
End: 2021-08-06
Payer: MEDICAID

## 2021-08-06 VITALS
WEIGHT: 100 LBS | HEART RATE: 100 BPM | BODY MASS INDEX: 18.4 KG/M2 | SYSTOLIC BLOOD PRESSURE: 100 MMHG | HEIGHT: 62 IN | DIASTOLIC BLOOD PRESSURE: 52 MMHG

## 2021-08-06 DIAGNOSIS — E87.6 HYPOKALEMIA: ICD-10-CM

## 2021-08-06 DIAGNOSIS — J45.30 MILD PERSISTENT ASTHMA WITHOUT COMPLICATION: Primary | ICD-10-CM

## 2021-08-06 DIAGNOSIS — R94.31 ECG ABNORMAL: ICD-10-CM

## 2021-08-06 DIAGNOSIS — E87.6 HYPOKALEMIA: Primary | ICD-10-CM

## 2021-08-06 DIAGNOSIS — R94.31 ABNORMAL ECG: ICD-10-CM

## 2021-08-06 DIAGNOSIS — O09.92 SUPERVISION OF HIGH RISK PREGNANCY IN SECOND TRIMESTER: ICD-10-CM

## 2021-08-06 PROCEDURE — 99213 OFFICE O/P EST LOW 20 MIN: CPT | Performed by: ADVANCED PRACTICE MIDWIFE

## 2021-08-06 RX ORDER — PREDNISONE 20 MG/1
20 TABLET ORAL
COMMUNITY
Start: 2021-08-07 | End: 2021-08-10

## 2021-08-06 RX ORDER — ALBUTEROL SULFATE 90 UG/1
2 AEROSOL, METERED RESPIRATORY (INHALATION) EVERY 6 HOURS PRN
Qty: 8.5 G | Refills: 1 | Status: SHIPPED | OUTPATIENT
Start: 2021-08-06

## 2021-08-06 RX ORDER — PLANT STANOL ESTER 450 MG
2.5 TABLET ORAL
Qty: 60 TABLET | Refills: 0 | Status: SHIPPED | OUTPATIENT
Start: 2021-08-06 | End: 2021-08-06

## 2021-08-06 ASSESSMENT — MIFFLIN-ST. JEOR: SCORE: 1168.91

## 2021-08-06 NOTE — PROGRESS NOTES
SUBJECTIVE:                                                   Nehal Dutta is a 21 year old who presents to clinic today for the following health issue(s):  Patient presents with:  Follow Up: was recently admitted for asthma attack, during stay was found to have low potassium-no further workup was done re: this. Patient here to be sure everything is ok. Currently 23w6d pregnant.      HPI:  Last week Nehal had a respiratory illness and along with the smoke/ air quality she had an asthma attack on 21. She was hospitalized at [facility] and was discharged today. She came in for a follow up.     Hospital Diagnoses    Hypokalemia    Tachycardia    Asthma with acute exacerbation, unspecified asthma severity, unspecified whether persistent (HRC)    Asthma with acute exacerbation, unspecified asthma severity, unspecified whether persistent (HRC)    Hypokalemia    Tachycardia     Recommendations for primary care / skilled facility physician:   1. Short prednisone taper 40--20mg x 3 (5 total days)  2. Refill albuterol neb and inhaler   3. Resume Flovent (refilled)   4. Follow up [facility] [place]   5. 6 tabs of zofran provided for nausea      Reviewing the hospital records it was noted...  1) Hypokalemia:  2.6. She was given potasium bumps and it increased to 4.2 on . It decreased to 3.0 on day of discharge (today).  2) ECG: ST & T wave abnormality; abnormal ECG.    She states she is feeling better, but it concerned about her potasium levels.         Patient's last menstrual period was 2021 (approximate).  Menstrual History: currently 23w6d rpregnant  Patient is not sexually active  .  Using not sexually active for contraception.   Health maintenance updated:  yes  STI infx testing offered:  Declined    Last PHQ-9 score on record =   PHQ-9 SCORE 2021   PHQ-9 Total Score MyChart 8 (Mild depression)   PHQ-9 Total Score 8     Last GAD7 score on record =   CARRIE-7 SCORE 2021   Total Score 13 (moderate anxiety)    Total Score 13         Problem list and histories reviewed & adjusted, as indicated.  Additional history: as documented.    Patient Active Problem List   Diagnosis     Allergic conjunctivitis     Myopia     Dysmenorrhea     Mild persistent asthma without complication     Gastroesophageal reflux disease without esophagitis     ASCUS with positive high risk HPV cervical     Supervision of high-risk pregnancy     BMI less than 19,adult     Nicotine dependence due to vaping tobacco product     Chlamydia trachomatis infection in mother during first trimester of pregnancy     Underweight     Past Surgical History:   Procedure Laterality Date     EYE SURGERY       NECK SURGERY  2004    cyst      Social History     Tobacco Use     Smoking status: Current Every Day Smoker     Packs/day: 0.00     Smokeless tobacco: Never Used     Tobacco comment: currently vapes- 4-5 vape every day   Substance Use Topics     Alcohol use: No     Alcohol/week: 0.0 standard drinks      Problem (# of Occurrences) Relation (Name,Age of Onset)    Breast Cancer (2) Paternal Aunt, Paternal Cousin    Cervical Cancer (1) Mother    Diabetes (1) Maternal Uncle    Hypertension (1) Maternal Grandmother    Liver Disease (1) Father    Myocardial Infarction (1) Father (51)    No Known Problems (5) Sister, Brother, Maternal Grandfather, Paternal Grandmother, Other    Thyroid Disease (1) Maternal Grandmother       Negative family history of: Cancer, Cerebrovascular Disease, Glaucoma, Macular Degeneration            Current Outpatient Medications   Medication Sig     albuterol (PROAIR HFA, PROVENTIL HFA, VENTOLIN HFA) 108 (90 BASE) MCG/ACT inhaler Inhale 2 puffs into the lungs every 6 hours as needed for shortness of breath / dyspnea or wheezing     famotidine (PEPCID) 20 MG tablet Take 1 tablet (20 mg) by mouth 2 times daily     FLOVENT  MCG/ACT inhaler Inhale 2 Puffs by mouth 2 times daily.     ipratropium - albuterol 0.5 mg/2.5 mg/3 mL (DUONEB)  "0.5-2.5 (3) MG/3ML neb solution Inhale 3 mLs into the lungs     ondansetron (ZOFRAN) 4 MG tablet Take 1 tablet (4 mg) by mouth every 6 hours as needed for nausea     [START ON 8/7/2021] predniSONE (DELTASONE) 20 MG tablet Take 20 mg by mouth     Prenat w/o A-FeCbGl-DSS-FA-DHA (CITRANATAL DHA) 27-1 & 250 MG MISC Take 1 tablet by mouth daily     triamcinolone (KENALOG) 0.1 % external cream Apply topically 2 times daily as needed for irritation     No current facility-administered medications for this visit.     Allergies   Allergen Reactions     Cantaloupe [Melon]      Peanuts [Nuts]      Animal Dander Rash       ROS:  12 point review of systems negative other than symptoms noted below or in the HPI.  12 point review of systems negative other than symptoms noted below.    OBJECTIVE:     /52   Pulse 100   Ht 1.562 m (5' 1.5\")   Wt 45.4 kg (100 lb)   LMP 02/07/2021 (Approximate)   BMI 18.59 kg/m    Body mass index is 18.59 kg/m .    PHYSICAL EXAM:  Constitutional:  Appearance: Well nourished, well developed alert, in no acute distress  Chest:  Respiratory Effort:  Breathing unlabored. Clear to auscultation bilaterally.   Cardiovascular: Heart: Auscultation:  Regular rate, normal rhythm, no murmurs present  Neurologic:  Mental Status:  Oriented X3.  Normal strength and tone, sensory exam grossly normal, mentation intact and speech normal.    Psychiatric:  Mentation appears normal and affect normal/bright.     In-Clinic Test Results:  No results found for this or any previous visit (from the past 24 hour(s)).    ASSESSMENT/PLAN:                                                        ICD-10-CM    1. Hypokalemia  E87.6    2. Abnormal ECG  R94.31 Adult Cardiology Eval Referral   3. Supervision of high risk pregnancy in second trimester  O09.92 Adult Cardiology Eval Referral       COUNSELING:  Reviewed hospital potasium and ECG report with Dr. Wade.     Plan of care:  1) Repeat electrolyte labs on Tuesday. Start " an OTC potassium supplement.   2) Cardiology referral for abnormal ECG result.    Discussed the plan of care with Nehal (her mother was also on the phone). She is in agreement. If concerns arise this weekend, she will contact on-call CNM.     15 minutes spent on the date of the encounter doing chart review, history and exam, documentation and further activities per the note    Anirudh Mahoney, SEGUN, APRN, CNM

## 2021-08-06 NOTE — TELEPHONE ENCOUNTER
Nehal was seen in the clinic today.    She was instructed to start on OTC K+ supplements.  She is requesting an Rx for K+ as she does not have money for OTC, especially due to recent ER visits.    Notified that on-call provider would be paged. Call back if no provider contact within 20 minutes    6:05 pm - Smart web page sent to on-call provider, JAH BhatM:  MARCELA Castro-FNA   Pt: Nehal Dutta  Pt ph: [phone]  : 22  Requesting Rx for K+. Doesn't have money for OTC  MRN: [id number]  CNM: [facility]    COVID 19 Nurse Triage Plan/Patient Instructions    Please be aware that novel coronavirus (COVID-19) may be circulating in the community. If you develop symptoms such as fever, cough, or SOB or if you have concerns about the presence of another infection including coronavirus (COVID-19), please contact your health care provider or visit [email or web address].     Disposition/Instructions    Virtual Visit with provider recommended. Reference Visit Selection Guide.    Thank you for taking steps to prevent the spread of this virus.  o Limit your contact with others.  o Wear a simple mask to cover your cough.  o Wash your hands well and often.    Resources    [facility]: About COVID-19: [email or web address]    CDC: What to Do If You're Sick: www.cdc.gov/coronavirus/2019-ncov/about/steps-when-sick.html    CDC: Ending Home Isolation: www.cdc.gov/coronavirus/2019-ncov/hcp/disposition-in-home-patients.html     CDC: Caring for Someone: www.cdc.gov/coronavirus/2019-ncov/if-you-are-sick/care-for-someone.html     [facility]: Interim Guidance for Hospital Discharge to Home: www.health.[facility].mn.us/diseases/coronavirus/hcp/hospdischarge.pdf    [facility] clinical trials (COVID-19 research studies): [email or web address]     Below are the COVID-19 hotlines at the [place] Department of Health ([facility]). Interpreters are available.   o For health questions: Call  [phone] or 1-854.783.1715 (7 a.m. to 7 p.m.)  o For questions about schools and childcare: Call 094-168-1822 or 1-149.100.3664 (7 a.m. to 7 p.m.)     Mini Khan RN  [facility]      Reason for Disposition    [1] Caller requesting a NON-URGENT new prescription or refill AND [2] triager unable to refill per unit policy    Protocols used: MEDICATION QUESTION CALL-A-

## 2021-08-06 NOTE — TELEPHONE ENCOUNTER
Pt called stating she was trying to reach one of the midwifes, and during th christine said she is calling me back now and ended the call.    Ja Holman RN  [facility]

## 2021-08-07 NOTE — TELEPHONE ENCOUNTER
Nehal is requesting to speak to Nicole Pinto CNM again.    She states that the pharmacy does not have the Rx K+ in stock. She was told that they have 99 mg OTC.     Pt feels that this is an emergent need and requests to speak to RANCHO    Notified that on-call provider would be paged. Call back if no provider contact within 20 minutes.    8:12 pm - Smart web page sent to on-call provider, Nicole Pinto CNM:  MARCELA Castro-FNA   Pt: Nehal Dutta   Pt ph: [phone]   : 22  Request to speak to you re: K+; Pharmacy does not have Rx dose; Pharm has 99 mg OTC (~= to 2.53 mEq)  MRN: [id number]   CNM: [facility]    Mini Khan, MARCELA  [facility]

## 2021-08-07 NOTE — PROGRESS NOTES
Nehal paged on call CNM requesting RX for OTC potassium sent to pharmacy, does not have the means to purchase at this time. She also requests refill of albuterol inhaler. RX sent for equivalent of OTC potassium as discussed in clinic but CNM and on call MD, cardiology referral in place. Patient is overall feeling well since discharge, denies chest pain or other concerns just requesting prescription. Keep appt Tuesday for repeat labs.    DAPHNE Sprague, CNM

## 2021-08-08 NOTE — PROGRESS NOTES
Feels well. She was able to rest this weekend and eat.   Fetal movement: positive   Denies loss of fluid/vb/contractions    Asthma: Hospitalized last week. Overall feeling better.     Hypokalemia: Diagnosed last week in the hospital. She did receive potasium in the hospital, but was discharged with it still low. I saw her on Friday and recommended supplemental potasium. Supplement was taken all weekend.  She will continue to take the supplement. Labs drawn today. Will call and update her when lab is available.     Abnormal ECG in hospital during admission: Cardiology referral placed on Friday. She hasn't heard from cardiology at this time, but she will contact us if she hasn't heard back from them by tomorrow.    Size<Dates/ TWG 14 lbs/ Pregravid BMI: 16.73: Recommended growth ultrasound at next visit. Discussed that we will probably need growth ultrasounds throughout pregnancy. She is agreeable to plan of care.     GCT next visit, handout provided, reminded of longer appointment  Tdap next visit; reviewed CDC recommendations and partner/family vaccination recommended as well  Need for Rhogam? Not indicated.     Return to clinic 2-3 weeks for GCT and growth ultrasound.     Anirudh Mahoney, DNP, APRN, CNM

## 2021-08-09 ENCOUNTER — PATIENT OUTREACH (OUTPATIENT)
Dept: CARE COORDINATION | Facility: CLINIC | Age: 21
End: 2021-08-09

## 2021-08-09 NOTE — PROGRESS NOTES
Clinic Care Coordination Contact  [facility]/Voicemail       Clinical Data: Care Coordinator Outreach    Outreach attempted x 1.  Left message on patient's voicemail with call back information and requested return call.    Plan: Care Coordinator will try to reach patient again in 10 business days.    Navya Torres [facility]  Clinic Care Coordinator  [facility] Diann Lun[place]  [facility]  [phone]  [email or web address]

## 2021-08-10 ENCOUNTER — PRENATAL OFFICE VISIT (OUTPATIENT)
Dept: MIDWIFE SERVICES | Facility: CLINIC | Age: 21
End: 2021-08-10
Payer: MEDICAID

## 2021-08-10 ENCOUNTER — TELEPHONE (OUTPATIENT)
Dept: MIDWIFE SERVICES | Facility: CLINIC | Age: 21
End: 2021-08-10

## 2021-08-10 VITALS — WEIGHT: 104 LBS | SYSTOLIC BLOOD PRESSURE: 118 MMHG | DIASTOLIC BLOOD PRESSURE: 70 MMHG | BODY MASS INDEX: 19.33 KG/M2

## 2021-08-10 DIAGNOSIS — O09.92 SUPERVISION OF HIGH RISK PREGNANCY IN SECOND TRIMESTER: ICD-10-CM

## 2021-08-10 DIAGNOSIS — E87.6 HYPOKALEMIA: Primary | ICD-10-CM

## 2021-08-10 DIAGNOSIS — O26.12: ICD-10-CM

## 2021-08-10 LAB
ALBUMIN SERPL-MCNC: 3.1 G/DL (ref 3.4–5)
ALP SERPL-CCNC: 50 U/L (ref 40–150)
ALT SERPL W P-5'-P-CCNC: 19 U/L (ref 0–50)
ANION GAP SERPL CALCULATED.3IONS-SCNC: 8 MMOL/L (ref 3–14)
AST SERPL W P-5'-P-CCNC: 13 U/L (ref 0–45)
BILIRUB SERPL-MCNC: 0.3 MG/DL (ref 0.2–1.3)
BUN SERPL-MCNC: 11 MG/DL (ref 7–30)
CALCIUM SERPL-MCNC: 8.8 MG/DL (ref 8.5–10.1)
CHLORIDE BLD-SCNC: 105 MMOL/L (ref 94–109)
CO2 SERPL-SCNC: 24 MMOL/L (ref 20–32)
CREAT SERPL-MCNC: 0.61 MG/DL (ref 0.52–1.04)
GFR SERPL CREATININE-BSD FRML MDRD: >90 ML/MIN/1.73M2
GLUCOSE BLD-MCNC: 85 MG/DL (ref 70–99)
POTASSIUM BLD-SCNC: 3.6 MMOL/L (ref 3.4–5.3)
PROT SERPL-MCNC: 6.8 G/DL (ref 6.8–8.8)
SODIUM SERPL-SCNC: 137 MMOL/L (ref 133–144)

## 2021-08-10 PROCEDURE — 99207 PR PRENATAL VISIT: CPT | Performed by: ADVANCED PRACTICE MIDWIFE

## 2021-08-10 PROCEDURE — 36415 COLL VENOUS BLD VENIPUNCTURE: CPT | Performed by: ADVANCED PRACTICE MIDWIFE

## 2021-08-10 PROCEDURE — 80053 COMPREHEN METABOLIC PANEL: CPT | Performed by: ADVANCED PRACTICE MIDWIFE

## 2021-08-16 ENCOUNTER — TELEPHONE (OUTPATIENT)
Dept: MIDWIFE SERVICES | Facility: CLINIC | Age: 21
End: 2021-08-16

## 2021-08-16 NOTE — TELEPHONE ENCOUNTER
Patient calling clinic with concerns regarding ringworm in pregnancy. Phone was disconnected before RN could talk with patient.    Left message for patient to return call to clinic.     Krissy Carnes RN

## 2021-08-16 NOTE — TELEPHONE ENCOUNTER
Has an area on skin that is reddened, itches and slightly raised. Area does itch. Denies open areas or blisters.  Has been applying otc antifungal. 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   Also works in long term care.     Discussed should be seen in family medicine or UC. Area on skin is not increasing in size.     Krissy Carnes RN

## 2021-08-23 DIAGNOSIS — Z36.9 ENCOUNTER FOR ANTENATAL SCREENING OF MOTHER: Primary | ICD-10-CM

## 2021-08-23 DIAGNOSIS — Z23 NEED FOR TDAP VACCINATION: ICD-10-CM

## 2021-08-23 NOTE — PROGRESS NOTES
Clinic Care Coordination Contact  [facility]/Voicemail       Clinical Data: Care Coordinator Outreach    Outreach attempted x 2.  Left message on patient's voicemail with call back information and requested return call.    Plan: Care Coordinator will try to reach patient again in 1 month.    Navya Torres [facility]  Clinic Care Coordinator  [facility] Diann Galax  [facility]  [phone]  [email or web address]

## 2021-08-30 ENCOUNTER — PRENATAL OFFICE VISIT (OUTPATIENT)
Dept: MIDWIFE SERVICES | Facility: CLINIC | Age: 21
End: 2021-08-30
Payer: MEDICAID

## 2021-08-30 ENCOUNTER — LAB (OUTPATIENT)
Dept: LAB | Facility: CLINIC | Age: 21
End: 2021-08-30
Payer: MEDICAID

## 2021-08-30 VITALS — DIASTOLIC BLOOD PRESSURE: 58 MMHG | BODY MASS INDEX: 20.02 KG/M2 | WEIGHT: 107.7 LBS | SYSTOLIC BLOOD PRESSURE: 98 MMHG

## 2021-08-30 DIAGNOSIS — E87.6 HYPOKALEMIA: Primary | ICD-10-CM

## 2021-08-30 DIAGNOSIS — Z36.9 ENCOUNTER FOR ANTENATAL SCREENING OF MOTHER: ICD-10-CM

## 2021-08-30 DIAGNOSIS — E87.6 HYPOKALEMIA: ICD-10-CM

## 2021-08-30 DIAGNOSIS — O09.92 SUPERVISION OF HIGH RISK PREGNANCY IN SECOND TRIMESTER: ICD-10-CM

## 2021-08-30 PROBLEM — Z72.0 TOBACCO USE: Status: ACTIVE | Noted: 2021-08-05

## 2021-08-30 LAB
ERYTHROCYTE [DISTWIDTH] IN BLOOD BY AUTOMATED COUNT: 12 % (ref 10–15)
GLUCOSE 1H P 50 G GLC PO SERPL-MCNC: 99 MG/DL (ref 70–129)
HCT VFR BLD AUTO: 30.4 % (ref 35–47)
HGB BLD-MCNC: 9.6 G/DL (ref 11.7–15.7)
MCH RBC QN AUTO: 29.3 PG (ref 26.5–33)
MCHC RBC AUTO-ENTMCNC: 31.6 G/DL (ref 31.5–36.5)
MCV RBC AUTO: 93 FL (ref 78–100)
PLATELET # BLD AUTO: 199 10E3/UL (ref 150–450)
RBC # BLD AUTO: 3.28 10E6/UL (ref 3.8–5.2)
WBC # BLD AUTO: 9.6 10E3/UL (ref 4–11)

## 2021-08-30 PROCEDURE — 82952 GTT-ADDED SAMPLES: CPT

## 2021-08-30 PROCEDURE — 84132 ASSAY OF SERUM POTASSIUM: CPT

## 2021-08-30 PROCEDURE — 99207 PR PRENATAL VISIT: CPT | Performed by: ADVANCED PRACTICE MIDWIFE

## 2021-08-30 PROCEDURE — 85027 COMPLETE CBC AUTOMATED: CPT

## 2021-08-30 PROCEDURE — 86803 HEPATITIS C AB TEST: CPT

## 2021-08-30 PROCEDURE — 36415 COLL VENOUS BLD VENIPUNCTURE: CPT

## 2021-08-30 NOTE — PROGRESS NOTES
Feels well.  No further asthma exacerbations.  Some left sided pain that lasted about 5 min x 2 and has not recurred.   Fetal movement: positive, denies loss of fluid/vb/contractions  GCT, CBC drawn today as well as potassium  Tdap given: No  Too early  Hep C drawn: Yes  Water birth consent signed: No    Reviewed PTL precautions and S&S of PIH, patient verbalizes understanding and what to report  Hospital Registration reminder-online  Return to clinic 2 weeks    Constanza Romero, APRN, CNM

## 2021-08-30 NOTE — PATIENT INSTRUCTIONS
"PREECLAMPSIA SIGNS AND SYMPTOMS    Preeclampsia is a dangerous condition that some women develop in the second half of pregnancy. It can also begin after the baby is born.  Preeclampsia causes high blood pressure and can cause problems with many organ systems in your body.  It can also affect the growth of your baby. The exact cause of preeclampsia is unknown, however, there are signs and symptoms to watch for:    -A bad headache that doesn't improve with Tylenol  -Visual changes such as spots, flashes of light, blurry vision  -Pain in the upper right part of your abdomen, especially under the ribs that doesn't go away  -Nausea and/or vomiting  -Feeling extremely tired  -Yellowing of the skin and/or eyes  -Feeling \"not quite right\" or that something is wrong  -An extreme amount of swelling (some swelling in pregnancy is very normal)    If your midwife feels that you are developing preeclampsia, you will have lab tests drawn and will be monitored very closely.     If you are experiencing anyof these symptoms, call the [facility] immediately at [phone].  SIGNS OF  LABOR    Labor is  if it happens more than three weeks before your due date.    It can be hard to know if you are in labor, since the symptoms can be like the normal feelings of pregnancy.  Often, the only difference is the symptoms increase or they don't go away.     Signs of  labor can include:    Change in your vaginal discharge:  You will have more vaginal discharge when you are pregnant and it should be creamy white.  Call the clinic right away if your discharge has a foul odor, pink or bloody,  or if it becomes watery or is more than is normal for you during your pregnancy.    More than 5-6 contractions or tightenings per hour.  Contractions can feel like period cramps or bowel (gas or diarrhea) pain.  You will feel it in the lower part of your abdomen, in your back or as a pressure feeling in your " bottom.  It is often regular, coming for 30 seconds or a minute and then going away, only to come back 5 or 10 minutes later. Some contractions are normal during pregnancy, but if you are feeling more than 5-6 in one hour, empty your bladder, then drink 16-24 ounces of water, eat a snack and lay down on your left side. Put your hand on your abdomen to count the contractions.  If after one hour of resting you have still had 5-6 contractions call your clinic.

## 2021-08-30 NOTE — NURSING NOTE
Discussed adding iron supplement every other day at opposite time of day of PNV, pt agreed with plan

## 2021-08-31 DIAGNOSIS — O99.019 ANTEPARTUM ANEMIA COMPLICATING PREGNANCY: Primary | ICD-10-CM

## 2021-08-31 DIAGNOSIS — E87.6 HYPOKALEMIA: Primary | ICD-10-CM

## 2021-08-31 LAB
HCV AB SERPL QL IA: NONREACTIVE
POTASSIUM BLD-SCNC: 3.2 MMOL/L (ref 3.4–5.3)

## 2021-08-31 RX ORDER — LANOLIN ALCOHOL/MO/W.PET/CERES
1000 CREAM (GRAM) TOPICAL
Qty: 60 TABLET | Refills: 1 | Status: SHIPPED | OUTPATIENT
Start: 2021-08-31 | End: 2021-09-16

## 2021-08-31 RX ORDER — FERROUS GLUCONATE 324(38)MG
324 TABLET ORAL
Qty: 60 TABLET | Refills: 1 | Status: SHIPPED | OUTPATIENT
Start: 2021-08-31

## 2021-09-11 ENCOUNTER — NURSE TRIAGE (OUTPATIENT)
Dept: NURSING | Facility: CLINIC | Age: 21
End: 2021-09-11

## 2021-09-11 NOTE — TELEPHONE ENCOUNTER
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 Patient says her iron is low and wonders if that is related.    Also, she has been having sharp shooting in abdominal area daily. Today, while walking, she had cramping in her abdomen, then sharp shooting from back to leg. Leg pain level 5/10 while walking; at rest 2/10.    Disposition: be seen in L&D  Advised will page on-call midwife to discuss if she needs to be evaluated or not.  Reviewed care advice with caller per RN triage protocol guideline.  Advised to call back with worsening symptoms, concerns or questions. Caller verbalized understanding.      2:14 pm -- paged Anirudh Mahoney to call FNA back via Smart Web.    9/11, 2:31pm -- Call from Anirudh who says she has spoken to patient re: plan of care.    Diamond Leroy RN/[place] [facility]      Reason for Disposition    MODERATE-SEVERE abdominal pain (e.g., interferes with normal activities, awakens from sleep)    Additional Information    Negative: Passed out (i.e., lost consciousness, collapsed and was not responding)    Negative: Shock suspected (e.g., cold/pale/clammy skin, too weak to stand, low BP, rapid pulse)    Negative: Difficult to awaken or acting confused (e.g., disoriented, slurred speech)    Negative: [1] SEVERE abdominal pain (e.g., excruciating) AND [2] constant AND [3] present > 1 hour    Negative: SEVERE vaginal bleeding (e.g., continuous red blood from vagina, large blood clots)    Negative: Sounds like a life-threatening emergency to the triager    Negative: [1] Vomiting AND [2] contains red blood or black (\"coffee ground\") material  (Exception: few red streaks in vomit that only happened once)    Protocols used: PREGNANCY - ABDOMINAL PAIN GREATER THAN 20 WEEKS EGA-A-AH      "

## 2021-09-13 DIAGNOSIS — M54.30 SCIATICA, UNSPECIFIED LATERALITY: Primary | ICD-10-CM

## 2021-09-13 DIAGNOSIS — O09.92 SUPERVISION OF HIGH RISK PREGNANCY IN SECOND TRIMESTER: ICD-10-CM

## 2021-09-14 PROBLEM — O26.13 LOW WEIGHT GAIN DURING PREGNANCY IN THIRD TRIMESTER: Status: ACTIVE | Noted: 2021-09-14

## 2021-09-14 NOTE — PATIENT INSTRUCTIONS
SIGNS OF  LABOR    Labor is  if it happens more than three weeks before your due date.    It can be hard to know if you are in labor, since the symptoms can be like the normal feelings of pregnancy.  Often, the only difference is the symptoms increase or they don't go away.     Signs of  labor can include:      Contractions which can feel like period cramps or gas pain.  You may feel it in the lower part of your abdomen, in your back, or as a pressure feeling in your bottom.  It is often regular, coming every 5 or 10 minutes, and  lasting about 30-60 seconds. Some contractions are normal during pregnancy (Cabell carrasquillo contractions) but if you are feeling more than 5-6 in one hour that is NOT normal    If this occurs empty your bladder, then drink 2-3 glasses of water, eat a snack, and lay down on your left side. Put your hand on your abdomen to count the contractions.  If after one hour of resting you have still had 5-6 contractions call your clinic right away.      If you feel a pop, gush, or trickle of fluid it may mean that your bag of water has broken and you should contact the clinic       You may also experience loose stools and/or rectal pressure       Listen to your body, if something doesn't seem right please call us at the clinic    Risk Factors      Previous  delivery    Bacterial Vaginosis- if you notice a fishy smell to your discharge or experience vaginal itching/discomfort you should be evaluated for infection    Smoking    Drug abuse    Adolescent (teen) pregnancy or advanced maternal age (AMA) age 35 and over    Dehydration (this may not cause  labor but it can cause contractions)    If you think you are in  labor we may do some lab testing in the clinic or send you to the hospital for evaluation    Please call us if you are concerned you are in  labor.    [facility]  [phone]    PREECLAMPSIA SIGNS AND SYMPTOMS    Preeclampsia is a  "dangerous condition that some women develop in the second half of pregnancy. It can also begin after the baby is born.  Preeclampsia causes high blood pressure and can cause problems with many organ systems in your body.  It can also affect the growth of your baby. The exact cause of preeclampsia is unknown, however, there are signs and symptoms to watch for:    -A bad headache that doesn't improve with Tylenol  -Visual changes such as spots, flashes of light, blurry vision  -Pain in the upper right part of your abdomen, especially under the ribs that doesn't go away  -Nausea and/or vomiting  -Feeling extremely tired  -Yellowing of the skin and/or eyes  -Feeling \"not quite right\" or that something is wrong  -An extreme amount of swelling (some swelling in pregnancy is very normal)    If your midwife feels that you are developing preeclampsia, you will have lab tests drawn and will be monitored very closely.     If you are experiencing anyof these symptoms, call the [facility] immediately at [phone].    Fetal Kick Counts    It is important to know when your baby's movements occur. We often get busy with work and life and do not pay close attention to their movements.        Women typically begin feeling movement between 18-22 weeks of gestation, sometimes it can be earlier or later depending on where your placenta is       Movements usually begin feeling like popping or fluttering and as the baby grows they become more pronounced    Toward the end of pregnancy as the baby gets larger they may not move as much or make as big of movements. Babies have maturing sleep cycles as well as not as much room to move and flip. If you are ever concerned about your baby's movements or have not felt the baby move for a while, we recommend you do a fetal kick count. Prior to starting your count drink a glass of water or juice and eat a snack. Then lay down on your side and begin to count movements.     How to do " a Fetal Kick Counts    There are many different ways to monitor your baby's movements. Movements can range from large jabs to small kicks, or wiggles.  Hiccups count!      Count 10 movements in 2 hours when resting and focusing    Count 10 movements in 12 hours when doing normal activity    We recommend that if movements occur but seem decreased that you should be seen in the clinic or hospital for evaluation within 12 hours. If fetal movement is absent or fetal kick counts are low please contact us right away.    If you ever have any concerns about your baby's movements DO NOT HESITATE to call us, we are here for you!    [facility]  [phone]

## 2021-09-14 NOTE — PROGRESS NOTES
Feels okay overall.   Fetal Movement: positive, denies loss of fluid/vb, no contractions    Discussed partner situation. Jarred is FOB, but not super involved. Will probably be at the delivery. Jacky is her current boyfriend. He will probably not be at the delivery.     SI pain/ cramping: Called during the weekend due to pain that occurred when working. Described pain as sharp, shooting pains going from but done leg. Also states there was pain in lower abdomen - less than 6 per hour. This has been occurring for the past few weeks, but at times it gets bead enough that she has to stop and catch her breath. She states it goes away with rest. Discussed reducing hours at work for comfort (she is a ). She states she cannot do that because otherwise she will not have enough money to cover her bills. Pregnancy support belt ordered. She is picking it up today.   Today swabs for GC/CT, wet prep done to rule out infection for causing cramping.     Low weight gain in pregnancy/ prepregnancy BMI of <17: Growth ultrasound done today. Preliminary results show normal fetal growth at 36.2%. Discussed with Nehal. Growth U/S planned/ ordered for 34w.     Anemia: 8/30 hgb 9.6. Currently taking oral iron supplementation. Plan to redraw at next appointment.     Fetal kick counts reviewed and handout given  Reviewed PTL precautions and s/sx of preeclampsia; denies any S&S and aware of what to report    Discussed [employer] as an option for car seat and low/ free cost prenatal classes/ chiropractic care/ yoga.     Return to clinic in 2 weeks    Anirudh Mahoney, SEGUN, APRN, CNM

## 2021-09-16 ENCOUNTER — ANCILLARY PROCEDURE (OUTPATIENT)
Dept: ULTRASOUND IMAGING | Facility: CLINIC | Age: 21
End: 2021-09-16
Payer: COMMERCIAL

## 2021-09-16 ENCOUNTER — PRENATAL OFFICE VISIT (OUTPATIENT)
Dept: MIDWIFE SERVICES | Facility: CLINIC | Age: 21
End: 2021-09-16
Payer: COMMERCIAL

## 2021-09-16 VITALS — WEIGHT: 110.2 LBS | SYSTOLIC BLOOD PRESSURE: 106 MMHG | BODY MASS INDEX: 20.48 KG/M2 | DIASTOLIC BLOOD PRESSURE: 62 MMHG

## 2021-09-16 DIAGNOSIS — O26.13 LOW WEIGHT GAIN DURING PREGNANCY IN THIRD TRIMESTER: ICD-10-CM

## 2021-09-16 DIAGNOSIS — O09.92 SUPERVISION OF HIGH RISK PREGNANCY IN SECOND TRIMESTER: ICD-10-CM

## 2021-09-16 DIAGNOSIS — Z3A.30 30 WEEKS GESTATION OF PREGNANCY: ICD-10-CM

## 2021-09-16 DIAGNOSIS — Z23 NEED FOR TDAP VACCINATION: ICD-10-CM

## 2021-09-16 DIAGNOSIS — N89.8 VAGINAL DISCHARGE: ICD-10-CM

## 2021-09-16 DIAGNOSIS — O09.93 SUPERVISION OF HIGH RISK PREGNANCY IN THIRD TRIMESTER: Primary | ICD-10-CM

## 2021-09-16 DIAGNOSIS — O26.12: ICD-10-CM

## 2021-09-16 LAB
CLUE CELLS: ABNORMAL
TRICHOMONAS, WET PREP: ABNORMAL
WBC'S/HIGH POWER FIELD, WET PREP: ABNORMAL
YEAST, WET PREP: ABNORMAL

## 2021-09-16 PROCEDURE — 87491 CHLMYD TRACH DNA AMP PROBE: CPT | Performed by: ADVANCED PRACTICE MIDWIFE

## 2021-09-16 PROCEDURE — 90471 IMMUNIZATION ADMIN: CPT | Performed by: ADVANCED PRACTICE MIDWIFE

## 2021-09-16 PROCEDURE — 87210 SMEAR WET MOUNT SALINE/INK: CPT | Performed by: ADVANCED PRACTICE MIDWIFE

## 2021-09-16 PROCEDURE — 90715 TDAP VACCINE 7 YRS/> IM: CPT | Performed by: ADVANCED PRACTICE MIDWIFE

## 2021-09-16 PROCEDURE — 99207 PR PRENATAL VISIT: CPT | Performed by: ADVANCED PRACTICE MIDWIFE

## 2021-09-16 PROCEDURE — 87591 N.GONORRHOEAE DNA AMP PROB: CPT | Performed by: ADVANCED PRACTICE MIDWIFE

## 2021-09-16 PROCEDURE — 76816 OB US FOLLOW-UP PER FETUS: CPT | Performed by: OBSTETRICS & GYNECOLOGY

## 2021-09-16 NOTE — NURSING NOTE
Prior to immunization administration, verified patients identity using patient s name and date of birth. Please see Immunization Activity for additional information.     Screening Questionnaire for Adult Immunization    Are you sick today?   No   Do you have allergies to medications, food, a vaccine component or latex?   No   Have you ever had a serious reaction after receiving a vaccination?   No   Do you have a long-term health problem with heart, lung, kidney, or metabolic disease (e.g., diabetes), asthma, a blood disorder, no spleen, complement component deficiency, a cochlear implant, or a spinal fluid leak?  Are you on long-term aspirin therapy?   asthma   Do you have cancer, leukemia, HIV/AIDS, or any other immune system problem?   No   Do you have a parent, brother, or sister with an immune system problem?   Mom had cervical cancer   In the past 3 months, have you taken medications that affect  your immune system, such as prednisone, other steroids, or anticancer drugs; drugs for the treatment of rheumatoid arthritis, Crohn s disease, or psoriasis; or have you had radiation treatments?   No   Have you had a seizure, or a brain or other nervous system problem?   No   During the past year, have you received a transfusion of blood or blood    products, or been given immune (gamma) globulin or antiviral drug?   No   For women: Are you pregnant or is there a chance you could become       pregnant during the next month?   Yes   Have you received any vaccinations in the past 4 weeks?   No     Immunization questionnaire was positive for at least one answer.  Notified Anirudh Mahoney.        Per orders of Anirudh Mahoney, injection of tdap given by Iveth Raphael CMA. Patient instructed to remain in clinic for 15 minutes afterwards, and to report any adverse reaction to me immediately.       Screening performed by Iveth Raphael CMA on 9/16/2021 at 8:55 AM.

## 2021-09-17 LAB
C TRACH DNA SPEC QL NAA+PROBE: NEGATIVE
N GONORRHOEA DNA SPEC QL NAA+PROBE: NEGATIVE

## 2021-09-18 ENCOUNTER — HEALTH MAINTENANCE LETTER (OUTPATIENT)
Age: 21
End: 2021-09-18

## 2021-09-24 ENCOUNTER — PATIENT OUTREACH (OUTPATIENT)
Dept: NURSING | Facility: CLINIC | Age: 21
End: 2021-09-24
Payer: COMMERCIAL

## 2021-09-24 NOTE — LETTER
[facility]  [address] [address]    2021        Nehal Dutta  [address] [place] [address]        Here are Baby Supply resources:      Tandem - http://www.wearetandem.org/mamas-yvan.html  Individual Counseling and Support Groups. Childcare provided while utilizing Tandem services.  Care Packages - Every 3 months you can come meet with an advocate and together you will assemble a care package for your family.  Thrift Store - Gently used clothing at very affordable prices. Clothing, diapers, toys, books, small equipment  12 - 3pm. Diaper Depot $3 - $5 per pack, 1 pack per month per child  Located: [address] [place] [address] - phone: [phone]  Hours: Monday, Wednesday, Thursday 9am to 3pm and Tuesday appointment only     [facility] - https://[facility].org/  A supportive, non-judgmental environment for facing an unexpected pregnancy or sexual health concerns. Services include free pregnancy testing, , resources for general assistance, financial assistance, education and earn while you learn courses. Services assist new moms from pregnancy to parents (mom s, dad s, grandparents) with children up to 2 years of age.   Free parental classes. Incentives for class completion: free car seats, free crib, free clothing, free pack and play, and so much more!  Located: [address], [place] - phone: [phone] - appointments and walk-ins welcome  Hours: Monday, Tuesday, Wednesday, Friday 11am - 4pm and Thursday, Saturday by appointment    [facility] - https://metrowomenscenter.org/hope-program.html  Hope program: support available while pregnant until baby turns one. Earn points to redeem for baby items.  Located: [address] [place] [address] - phone: [phone]    New Day; Pregnancy Care Center - https://www.newdaycenter.org/  Free and confidential: pregnancy mentoring,   recovery support and information, parenting classes, earn while you learn classes, help for families, community resources, and 24 hour helpline  Located: [address] [place] [place] [address] - phone: [phone]   Hours: Monday, Wednesday, Friday 10am - 2pm and Thursday 10am - 6pm    [employer] - https://www.everyday-miracles.org/requests/  Most of our services are available for free for those on straight Medicaid or Blue plus, [facility] and [facility]. Car Seats, Breast pumps, and  services available. As well as Chiropractic and acupuncture services. Many different childbirth education, breastfeeding, and parenting classes are available.  [address], [place] [address] - phone: [phone]    Mary Bryan; Diaper [employer]ot - https://www.[facility].org/community/neighborhood-ministries/supporting-neighborhood-youth-and-children/  N - 6 $3 per pack, Pull-ups $4 per pack. 2 packs per child per month until 4 years old.  [address] [place] [address] - phone [phone]  Tuesday 4 - 6pm; 1st and 3rd Saturday 11am - 1pm    [employer] Hope - https://cradleofhope.org/    Cradle of Hope is a te-based organization that encourages life by providing financial aid to women and babies in crisis, especially those women who might not choose life because of financial pressures.   Offer support with rent/mortgage, utility bills, medical bills, childcare, transportation, portable cribs, safe sleep information, safe sleep class, baby shower baskets, and many other resources.  Location: [address], [place] [address]   Hours: Monday - Friday 8am - 4pm     [facility] - https://nlfs.org/services/pregnancy-help/  We can offer our Parenting Plus educational program to help you learn about parenting and receive baby items such as clothing, diapers, wipes, and new equipment as well information about additional community resources to assist you and your  child.  Location: [address] [place] [address] - phone: [phone]    Diaper resources  https://www.diaperbankmn.org/diaper-bank-partner-agencies/  https://www.diaperbankmn.org/find-diapers/    Clothing  https://www.Maana Mobile/html/[facility]_clothing_closets.html    https://www.Maana Mobile/html/Olive_clothing_closets.html    https://sites.51credit.com.com/view/kaitlynskl[place]et-[place]/home    General Baby supplies  https://babiesneedboxes.org/    https://birthright.org/services/?tab=3    WIC - http://www.health.[facility].us/divs/fh/wic/ppthome.html  A nutrition and breastfeeding program that helps young families eat well and be healthy.   WI can help: Pregnant woman learn about nutritious foods for a healthy pregnancy and birth. Support breastfeeding and help new moms meet their breastfeeding goals. Families provide nutritious foods to their young children so they are healthy, happy and ready to learn.  Phone: [phone] to find WIC office nearest you    Helpful [facility] Website - https://[facility].org/what-we-do/community-resources.html

## 2021-09-24 NOTE — PROGRESS NOTES
Clinic Care Coordination Contact    Follow Up Progress Note      Assessment: GILMA CARPIO spoke with pt regarding her overall wellbeing. Pt shared that she is doing well but has had concerns that she may be having ned-carrasquillo. She explained that she will get a one time pain that feels like a cramp but it doesn't not last long or continue. She said it is possible that baby is moving and that is what she is feeling. GILMA CARPIO encouraged her to monitor this but it is very possible that baby is moving or push out against her belly and causing the feeling of tension. GILMA CARPIO encouraged her to always sit down and rest with a glass of water if she is feeling anything that could be contractions.     Pt expressed concern regarding the size of her baby. GILMA CARPIO assured that baby is currently in the 36th percentile for her gestation age which is very healthy. Pt shared that she was really hoping baby would make it to at least 5lbs prior to birth. GILMA CARPIO explained that at baby's current growth this is very reasonable to assume she will be at least 5lbs or more. GILMA CARPIO encouraged her to discuss this with midwife for further understanding of baby's growth in remainder of pregnancy.    Pt discussed concern that due to low iron she may not be able to have an epidural in labor. GILMA CARPIO encouraged her to discuss her goal of having an epidural with midwifes so they can assist her with any treatments needed to support this goal. Pt discussed that she is very worried to with be hurt during labor due to her small stature. GILMA CARPIO assured that her body is likely capable of labor but she will also have a team for medical professionals helping her through labor and keeping her safe.    Discussed baby items. GILMA CARPIO will place referral for car seat through [employer]. GILMA CARPIO will send list of baby supply resources via [employer] for other items.    Care Gaps:    Health Maintenance Due   Topic Date Due     PREVENTIVE CARE VISIT  Never done     ADVANCE CARE  PLANNING  Never done     DEPRESSION ACTION PLAN  Never done     Pneumococcal Vaccine: Pediatrics (0 to 5 Years) and At-Risk Patients (6 to 64 Years) (1 of 2 - PPSV23) 06/04/2007     EYE EXAM  12/18/2016     ASTHMA CONTROL TEST  08/21/2017     ASTHMA ACTION PLAN  02/14/2018     COVID-19 Vaccine (2 - Moderna 2-dose series) 02/25/2021     MATERNAL SCREENING  Never done     INFLUENZA VACCINE (1) 09/01/2021     REPEAT ANTIBODY SCREEN (OB)  09/04/2021     PHQ-9  10/23/2021       Postponed to focus on pregnancy     Goals addressed this encounter:   Goals Addressed                    This Visit's Progress       Patient Stated       1. Psychosocial (pt-stated)   60%      Goal Statement: Before due date in November, I would like to explore the options for my future to support myself and my baby after birth to ensure our overall health and wellbeing.  Date Goal set: 4/16/2021  Barriers: None  Strengths: Strong support system, very motivated to ensure my wellbeing and what is best for baby  Date to Achieve By: 11/1/2021  Patient expressed understanding of goal: yes  Action steps to achieve this goal:  1. I will explore ways to manage my anxiety  2. I will present all questions related to concerns for pregnancy or baby to midwives or care team  3. I will outreach to Care Coordination  for further questions or concerns            Outreach Frequency: monthly    Plan: CC SW will place referral for [employer] for car seat. CC SW will outreach to pt in 1 month to discuss overall wellbeing.    Navya Torres [facility]  Clinic Care Coordinator  [facility] Diann Elbert  [facility]  [phone]  [email or web address]

## 2021-09-28 ENCOUNTER — TELEPHONE (OUTPATIENT)
Dept: MIDWIFE SERVICES | Facility: CLINIC | Age: 21
End: 2021-09-28

## 2021-09-28 NOTE — PATIENT INSTRUCTIONS
SIGNS OF  LABOR    Labor is  if it happens more than three weeks before your due date.    It can be hard to know if you are in labor, since the symptoms can be like the normal feelings of pregnancy.  Often, the only difference is the symptoms increase or they don't go away.     Signs of  labor can include:      Contractions which can feel like period cramps or gas pain.  You may feel it in the lower part of your abdomen, in your back, or as a pressure feeling in your bottom.  It is often regular, coming every 5 or 10 minutes, and  lasting about 30-60 seconds. Some contractions are normal during pregnancy ([place] carrasquillo contractions) but if you are feeling more than 5-6 in one hour that is NOT normal    If this occurs empty your bladder, then drink 2-3 glasses of water, eat a snack, and lay down on your left side. Put your hand on your abdomen to count the contractions.  If after one hour of resting you have still had 5-6 contractions call your clinic right away.      If you feel a pop, gush, or trickle of fluid it may mean that your bag of water has broken and you should contact the clinic       You may also experience loose stools and/or rectal pressure       Listen to your body, if something doesn't seem right please call us at the clinic    Risk Factors      Previous  delivery    Bacterial Vaginosis- if you notice a fishy smell to your discharge or experience vaginal itching/discomfort you should be evaluated for infection    Smoking    Drug abuse    Adolescent (teen) pregnancy or advanced maternal age (AMA) age 35 and over    Dehydration (this may not cause  labor but it can cause contractions)    If you think you are in  labor we may do some lab testing in the clinic or send you to the hospital for evaluation    Please call us if you are concerned you are in  labor.    [facility]  [phone]      PREECLAMPSIA SIGNS AND  "SYMPTOMS    Preeclampsia is a dangerous condition that some women develop in the second half of pregnancy. It can also begin after the baby is born.  Preeclampsia causes high blood pressure and can cause problems with many organ systems in your body.  It can also affect the growth of your baby. The exact cause of preeclampsia is unknown, however, there are signs and symptoms to watch for:    -A bad headache that doesn't improve with Tylenol  -Visual changes such as spots, flashes of light, blurry vision  -Pain in the upper right part of your abdomen, especially under the ribs that doesn't go away  -Nausea and/or vomiting  -Feeling extremely tired  -Yellowing of the skin and/or eyes  -Feeling \"not quite right\" or that something is wrong  -An extreme amount of swelling (some swelling in pregnancy is very normal)    If your midwife feels that you are developing preeclampsia, you will have lab tests drawn and will be monitored very closely.     If you are experiencing anyof these symptoms, call the [facility] immediately at [phone].    Fetal Kick Counts    It is important to know when your baby's movements occur. We often get busy with work and life and do not pay close attention to their movements.        Women typically begin feeling movement between 18-22 weeks of gestation, sometimes it can be earlier or later depending on where your placenta is       Movements usually begin feeling like popping or fluttering and as the baby grows they become more pronounced    Toward the end of pregnancy as the baby gets larger they may not move as much or make as big of movements. Babies have maturing sleep cycles as well as not as much room to move and flip. If you are ever concerned about your baby's movements or have not felt the baby move for a while, we recommend you do a fetal kick count. Prior to starting your count drink a glass of water or juice and eat a snack. Then lay down on your side and " begin to count movements.     How to do a Fetal Kick Counts    There are many different ways to monitor your baby's movements. Movements can range from large jabs to small kicks, or wiggles.  Hiccups count!      Count 10 movements in 2 hours when resting and focusing    Count 10 movements in 12 hours when doing normal activity    We recommend that if movements occur but seem decreased that you should be seen in the clinic or hospital for evaluation within 12 hours. If fetal movement is absent or fetal kick counts are low please contact us right away.    If you ever have any concerns about your baby's movements DO NOT HESITATE to call us, we are here for you!    [facility]  [phone]

## 2021-09-28 NOTE — PROGRESS NOTES
Feels ok with exception of tooth pain which started a couple days ago.   Has tooth pain on left side, where her wisdom teeth are. Went to the dentist yesterday and was told she had dental caries and crowding of teeth. Dentist recommended Tylenol and Orajel which she has been taking. States this helps but then the pain comes back. She is able to drink and eat softer foods but is worried about losing weight. Dentist placed referral for oral surgery but stated they would not remove wisdom teeth until after pregnancy.    Has been taking oral iron. Repeat CBC today with iron studies, if no improvement will plan for IV iron infusions. Nehal is in agreement with this plan.    Fetal Movement: positive, denies loss of fluid/vb, no contractions  Fetal kick counts/movement reviewed  Reviewed PTL precautions and s/sx of preeclampsia; denies any S&S and aware of what to report     Peds chosen: No, options discussed.   Pediatrician: Hep B, Vit K, Erythromycin     Plans to breastfeed: thinks so, would like to try but her mother formula fed all of her children so Nehal may decide to formula feed too  Breastfeeding class planned No     Advised to continue with Tylenol and Orajel regimen. Narcotics are not recommended for long term use for pain relief in third trimester. Also discussed with on call MD if any further recommendations, Dr. Conklin suggested seeing another dentist, gave recommendation for Dr. Bernal's office in [facility]. May need to have xrays done to r/o abscess. Dr. Bernal's office information provided to Nehal in [employer] message as she needed to leave to get to work.     Needs growth US with next visit, order already placed.     Return to clinic 2 weeks for growth US and CNM visit    DAPHNE Mccauley, RANCHO

## 2021-09-28 NOTE — TELEPHONE ENCOUNTER
This morning woke up with tooth pain-Nehal is tearful-having some issues she believes with her one of her wisdom teeth  Unable to get in until mid October    Called dental office-they can actually get her in today at 1800    Pt informed-she does work until 1800 today but will contact her dental office if she is UNABLE to make this appt.   Pt informed if she needs a note for work we can write one for her  Yaneth Brambila RN on 9/28/2021 at 2:09 PM

## 2021-09-29 ENCOUNTER — NURSE TRIAGE (OUTPATIENT)
Dept: NURSING | Facility: CLINIC | Age: 21
End: 2021-09-29

## 2021-09-29 NOTE — TELEPHONE ENCOUNTER
"Please call patient at ph. [phone]    Patient calling stating she was seen by the dentist yesterday for decaying wisdom teeth, and advised she would need to wait until after birth of baby to have surgery for wisdom teeth.    Stating they were unable to give her anything more for pain.   Patient stating she is in so much pain. Reporting she is taking Tylenol ES 1000 mg with very little improvement in pain along with Orajel. Reviewed Acetaminophen dosing per guidelines.   Afebrile.   Patient is requesting to know if she can anything further for pain control?    Pharm[facility] Betsey Farmer, RN  [place] [facility]      Reason for Disposition    [1] SEVERE pain (e.g., excruciating, unable to do any normal activities) AND [2] not improved 2 hours after pain medicine    Additional Information    Negative: Shock suspected (e.g., cold/pale/clammy skin, too weak to stand, low BP, rapid pulse)    Negative: [1] Similar pain previously AND [2] it was from \"heart attack\"    Negative: [1] Similar pain previously AND [2] it was from \"angina\" AND [3] not relieved by nitroglycerin    Negative: Sounds like a life-threatening emergency to the triager    Negative: Tongue is very swollen and tender    Negative: [1] Face is swollen AND [2] fever    Negative: Patient sounds very sick or weak to the triager    Protocols used: TOOTHACHE-A-AH      "

## 2021-09-29 NOTE — TELEPHONE ENCOUNTER
Nehal states wisdom teeth are painful. Using 1000mg of tylenol for pain and is not helping. Also using oragel. 2 of teeth are decaying and causing headaches, facial pain, nausea and feels worse off then in beginning of pregnancy.     Patient notified of Anirudh's instructions below. Understood and agreed with plan. Will continue with tylenol.     Krissy Carnes RN

## 2021-09-29 NOTE — TELEPHONE ENCOUNTER
31w4d  Tooth problem- unable to have wisdom teeth extracted until after delivery.    Routing to Midwives to review and advise.    Krissy Carnes RN

## 2021-09-29 NOTE — TELEPHONE ENCOUNTER
It isn't safe for us to start prescribing opioids due to the length of time she would need to be on them until delivery. We can evaluate and get more of the story at her next visit (tomorrow). We may be able to write a note to allow the dentist to extract the teeth in pregnancy - maybe?.     Anirudh Mahoney, DNP, APRN, CNM

## 2021-09-30 ENCOUNTER — PRENATAL OFFICE VISIT (OUTPATIENT)
Dept: MIDWIFE SERVICES | Facility: CLINIC | Age: 21
End: 2021-09-30
Payer: COMMERCIAL

## 2021-09-30 VITALS — WEIGHT: 112 LBS | DIASTOLIC BLOOD PRESSURE: 82 MMHG | BODY MASS INDEX: 20.82 KG/M2 | SYSTOLIC BLOOD PRESSURE: 124 MMHG

## 2021-09-30 DIAGNOSIS — Z20.822 ENCOUNTER FOR LABORATORY TESTING FOR COVID-19 VIRUS: ICD-10-CM

## 2021-09-30 DIAGNOSIS — O09.93 SUPERVISION OF HIGH RISK PREGNANCY IN THIRD TRIMESTER: ICD-10-CM

## 2021-09-30 DIAGNOSIS — O99.019 ANTEPARTUM ANEMIA COMPLICATING PREGNANCY: ICD-10-CM

## 2021-09-30 DIAGNOSIS — J45.30 MILD PERSISTENT ASTHMA WITHOUT COMPLICATION: ICD-10-CM

## 2021-09-30 DIAGNOSIS — R63.6 UNDERWEIGHT: ICD-10-CM

## 2021-09-30 DIAGNOSIS — K21.9 GASTROESOPHAGEAL REFLUX DISEASE WITHOUT ESOPHAGITIS: ICD-10-CM

## 2021-09-30 DIAGNOSIS — O99.013 ANEMIA DURING PREGNANCY IN THIRD TRIMESTER: ICD-10-CM

## 2021-09-30 DIAGNOSIS — O26.13 LOW WEIGHT GAIN DURING PREGNANCY IN THIRD TRIMESTER: Primary | ICD-10-CM

## 2021-09-30 DIAGNOSIS — Z3A.31 31 WEEKS GESTATION OF PREGNANCY: ICD-10-CM

## 2021-09-30 DIAGNOSIS — Z23 NEED FOR PROPHYLACTIC VACCINATION AND INOCULATION AGAINST INFLUENZA: ICD-10-CM

## 2021-09-30 LAB
ERYTHROCYTE [DISTWIDTH] IN BLOOD BY AUTOMATED COUNT: 12.6 % (ref 10–15)
HCT VFR BLD AUTO: 29.6 % (ref 35–47)
HGB BLD-MCNC: 9 G/DL (ref 11.7–15.7)
MCH RBC QN AUTO: 27.1 PG (ref 26.5–33)
MCHC RBC AUTO-ENTMCNC: 30.4 G/DL (ref 31.5–36.5)
MCV RBC AUTO: 89 FL (ref 78–100)
PLATELET # BLD AUTO: 207 10E3/UL (ref 150–450)
RBC # BLD AUTO: 3.32 10E6/UL (ref 3.8–5.2)
WBC # BLD AUTO: 8.6 10E3/UL (ref 4–11)

## 2021-09-30 PROCEDURE — 99207 PR PRENATAL VISIT: CPT | Performed by: ADVANCED PRACTICE MIDWIFE

## 2021-09-30 PROCEDURE — 36415 COLL VENOUS BLD VENIPUNCTURE: CPT | Performed by: ADVANCED PRACTICE MIDWIFE

## 2021-09-30 PROCEDURE — 90471 IMMUNIZATION ADMIN: CPT | Performed by: ADVANCED PRACTICE MIDWIFE

## 2021-09-30 PROCEDURE — 85027 COMPLETE CBC AUTOMATED: CPT | Performed by: ADVANCED PRACTICE MIDWIFE

## 2021-09-30 PROCEDURE — 83550 IRON BINDING TEST: CPT | Performed by: ADVANCED PRACTICE MIDWIFE

## 2021-09-30 PROCEDURE — 82728 ASSAY OF FERRITIN: CPT | Performed by: ADVANCED PRACTICE MIDWIFE

## 2021-09-30 PROCEDURE — 90686 IIV4 VACC NO PRSV 0.5 ML IM: CPT | Performed by: ADVANCED PRACTICE MIDWIFE

## 2021-09-30 RX ORDER — HEPARIN SODIUM,PORCINE 10 UNIT/ML
5 VIAL (ML) INTRAVENOUS
Status: CANCELLED | OUTPATIENT
Start: 2021-10-04

## 2021-09-30 RX ORDER — HEPARIN SODIUM (PORCINE) LOCK FLUSH IV SOLN 100 UNIT/ML 100 UNIT/ML
5 SOLUTION INTRAVENOUS
Status: CANCELLED | OUTPATIENT
Start: 2021-10-04

## 2021-09-30 RX ORDER — METHYLPREDNISOLONE SODIUM SUCCINATE 125 MG/2ML
125 INJECTION, POWDER, LYOPHILIZED, FOR SOLUTION INTRAMUSCULAR; INTRAVENOUS
Status: CANCELLED
Start: 2021-10-04

## 2021-09-30 RX ORDER — ACETAMINOPHEN 500 MG
500-1000 TABLET ORAL EVERY 6 HOURS PRN
COMMUNITY

## 2021-09-30 RX ORDER — NALOXONE HYDROCHLORIDE 0.4 MG/ML
0.2 INJECTION, SOLUTION INTRAMUSCULAR; INTRAVENOUS; SUBCUTANEOUS
Status: CANCELLED | OUTPATIENT
Start: 2021-10-04

## 2021-09-30 RX ORDER — DIPHENHYDRAMINE HYDROCHLORIDE 50 MG/ML
50 INJECTION INTRAMUSCULAR; INTRAVENOUS
Status: CANCELLED
Start: 2021-10-04

## 2021-09-30 RX ORDER — MEPERIDINE HYDROCHLORIDE 25 MG/ML
25 INJECTION INTRAMUSCULAR; INTRAVENOUS; SUBCUTANEOUS EVERY 30 MIN PRN
Status: CANCELLED | OUTPATIENT
Start: 2021-10-04

## 2021-09-30 RX ORDER — ALBUTEROL SULFATE 0.83 MG/ML
2.5 SOLUTION RESPIRATORY (INHALATION)
Status: CANCELLED | OUTPATIENT
Start: 2021-10-04

## 2021-09-30 RX ORDER — ALBUTEROL SULFATE 90 UG/1
1-2 AEROSOL, METERED RESPIRATORY (INHALATION)
Status: CANCELLED
Start: 2021-10-04

## 2021-09-30 RX ORDER — EPINEPHRINE 1 MG/ML
0.3 INJECTION, SOLUTION, CONCENTRATE INTRAVENOUS EVERY 5 MIN PRN
Status: CANCELLED | OUTPATIENT
Start: 2021-10-04

## 2021-09-30 NOTE — RESULT ENCOUNTER NOTE
Informed via MyChart and voicemail. Needs iron transfusions. COVID test and transfusions ordered.     DAPHNE Mccauley, CNM

## 2021-10-01 ENCOUNTER — TELEPHONE (OUTPATIENT)
Dept: MIDWIFE SERVICES | Facility: CLINIC | Age: 21
End: 2021-10-01

## 2021-10-01 LAB
FERRITIN SERPL-MCNC: 3 NG/ML (ref 12–150)
IRON SATN MFR SERPL: 4 % (ref 15–46)
IRON SERPL-MCNC: 25 UG/DL (ref 35–180)
TIBC SERPL-MCNC: 621 UG/DL (ref 240–430)

## 2021-10-01 NOTE — RESULT ENCOUNTER NOTE
Pt is aware of need for IV iron transfusions. COVID swab and IV iron ordered, pt still needs to schedule.    DAPHNE Mccauley, CNM

## 2021-10-01 NOTE — TELEPHONE ENCOUNTER
Nehal needs to get scheduled for Iron infusion / covid testing prior - see notes from 9/29 - after she gets scheduled with iron infusion - please let me know and I will personally help her get her covid test scheduled since she has been having such problems.

## 2021-10-07 ENCOUNTER — ALLIED HEALTH/NURSE VISIT (OUTPATIENT)
Dept: NURSING | Facility: CLINIC | Age: 21
End: 2021-10-07
Payer: COMMERCIAL

## 2021-10-07 DIAGNOSIS — Z20.822 ENCOUNTER FOR LABORATORY TESTING FOR COVID-19 VIRUS: ICD-10-CM

## 2021-10-07 PROCEDURE — U0005 INFEC AGEN DETEC AMPLI PROBE: HCPCS

## 2021-10-07 PROCEDURE — U0003 INFECTIOUS AGENT DETECTION BY NUCLEIC ACID (DNA OR RNA); SEVERE ACUTE RESPIRATORY SYNDROME CORONAVIRUS 2 (SARS-COV-2) (CORONAVIRUS DISEASE [COVID-19]), AMPLIFIED PROBE TECHNIQUE, MAKING USE OF HIGH THROUGHPUT TECHNOLOGIES AS DESCRIBED BY CMS-2020-01-R: HCPCS

## 2021-10-07 PROCEDURE — 99207 PR NO CHARGE NURSE ONLY: CPT

## 2021-10-08 LAB — SARS-COV-2 RNA RESP QL NAA+PROBE: NEGATIVE

## 2021-10-11 ENCOUNTER — NURSE TRIAGE (OUTPATIENT)
Dept: NURSING | Facility: CLINIC | Age: 21
End: 2021-10-11

## 2021-10-11 ENCOUNTER — HOSPITAL ENCOUNTER (EMERGENCY)
Facility: CLINIC | Age: 21
Discharge: HOME | End: 2021-10-11
Payer: COMMERCIAL

## 2021-10-11 ENCOUNTER — INFUSION THERAPY VISIT (OUTPATIENT)
Dept: INFUSION THERAPY | Facility: CLINIC | Age: 21
End: 2021-10-11
Attending: ADVANCED PRACTICE MIDWIFE
Payer: COMMERCIAL

## 2021-10-11 VITALS
RESPIRATION RATE: 16 BRPM | HEART RATE: 76 BPM | SYSTOLIC BLOOD PRESSURE: 111 MMHG | DIASTOLIC BLOOD PRESSURE: 71 MMHG | TEMPERATURE: 98.6 F | OXYGEN SATURATION: 98 %

## 2021-10-11 DIAGNOSIS — O99.013 ANEMIA DURING PREGNANCY IN THIRD TRIMESTER: Primary | ICD-10-CM

## 2021-10-11 PROCEDURE — 258N000003 HC RX IP 258 OP 636: Performed by: ADVANCED PRACTICE MIDWIFE

## 2021-10-11 PROCEDURE — 96365 THER/PROPH/DIAG IV INF INIT: CPT

## 2021-10-11 PROCEDURE — 250N000011 HC RX IP 250 OP 636: Performed by: ADVANCED PRACTICE MIDWIFE

## 2021-10-11 RX ORDER — NALOXONE HYDROCHLORIDE 0.4 MG/ML
0.2 INJECTION, SOLUTION INTRAMUSCULAR; INTRAVENOUS; SUBCUTANEOUS
Status: CANCELLED | OUTPATIENT
Start: 2021-10-13

## 2021-10-11 RX ORDER — METHYLPREDNISOLONE SODIUM SUCCINATE 125 MG/2ML
125 INJECTION, POWDER, LYOPHILIZED, FOR SOLUTION INTRAMUSCULAR; INTRAVENOUS
Status: CANCELLED
Start: 2021-10-13

## 2021-10-11 RX ORDER — HEPARIN SODIUM (PORCINE) LOCK FLUSH IV SOLN 100 UNIT/ML 100 UNIT/ML
5 SOLUTION INTRAVENOUS
Status: CANCELLED | OUTPATIENT
Start: 2021-10-13

## 2021-10-11 RX ORDER — EPINEPHRINE 1 MG/ML
0.3 INJECTION, SOLUTION INTRAMUSCULAR; SUBCUTANEOUS EVERY 5 MIN PRN
Status: CANCELLED | OUTPATIENT
Start: 2021-10-13

## 2021-10-11 RX ORDER — ALBUTEROL SULFATE 0.83 MG/ML
2.5 SOLUTION RESPIRATORY (INHALATION)
Status: CANCELLED | OUTPATIENT
Start: 2021-10-13

## 2021-10-11 RX ORDER — MEPERIDINE HYDROCHLORIDE 25 MG/ML
25 INJECTION INTRAMUSCULAR; INTRAVENOUS; SUBCUTANEOUS EVERY 30 MIN PRN
Status: CANCELLED | OUTPATIENT
Start: 2021-10-13

## 2021-10-11 RX ORDER — DIPHENHYDRAMINE HYDROCHLORIDE 50 MG/ML
50 INJECTION INTRAMUSCULAR; INTRAVENOUS
Status: CANCELLED
Start: 2021-10-13

## 2021-10-11 RX ORDER — HEPARIN SODIUM,PORCINE 10 UNIT/ML
5 VIAL (ML) INTRAVENOUS
Status: CANCELLED | OUTPATIENT
Start: 2021-10-13

## 2021-10-11 RX ORDER — ALBUTEROL SULFATE 90 UG/1
1-2 AEROSOL, METERED RESPIRATORY (INHALATION)
Status: CANCELLED
Start: 2021-10-13

## 2021-10-11 RX ADMIN — IRON SUCROSE 300 MG: 20 INJECTION, SOLUTION INTRAVENOUS at 14:26

## 2021-10-11 NOTE — TELEPHONE ENCOUNTER
First iron transfusion today to help prepare for birth of baby.  Got home and feet started itching and falling asleep, now both feet and ankles have swollen up and black and blue tiny dots/marks on feet going up legs. Denies swallowing/breathing problem/facial swelling.  Denies fever.    Pregnant due at end of the month, 21.    Paged on call CNM [facility] for secondary triage to rule out iron transfusion reaction and need for ER.  Per CNM on call, swelling is a possible side affect of iron transfusion, and provider recommends patient be evaluated in the ER.    Patient updated and agrees to plan to go to ER.     [facility]      OB Triage Call        33w2d    Estimated Date of Delivery: 2021        OB History    Para Term  AB Living   2 0 0 0 1 0   SAB TAB Ectopic Multiple Live Births   1 0 0 0 0      # Outcome Date GA Lbr Magnus/2nd Weight Sex Delivery Anes PTL Lv   2 Current            1 SAB 20     AB, MISSED          No results found for: GBS       Theodora Rosales RN 10/11/21 5:13 PM  [facility] Nurse [facility]    Reason for Disposition    Patient sounds very sick or weak to the triager    Additional Information    Negative: [1] Sudden onset of rash (within last 2 hours) AND [2] difficulty with breathing or swallowing    Negative: Sounds like a life-threatening emergency to the triager    Negative: [1] Localized purple or blood-colored spots or dots AND [2] not from injury or friction AND [3] fever    Protocols used: RASH OR REDNESS - [id number]

## 2021-10-11 NOTE — PROGRESS NOTES
Infusion Nursing Note:  Nehal Dutta presents today for Venofer.    Patient seen by provider today: No   present during visit today: Not Applicable.    Note: N/A.      Intravenous Access:  Peripheral IV placed.    Treatment Conditions:  Not Applicable.      Post Infusion Assessment:  Patient tolerated infusion without incident.  Blood return noted pre and post infusion.  Site patent and intact, free from redness, edema or discomfort.  No evidence of extravasations.  Access discontinued per protocol.       Discharge Plan:   Discharge instructions reviewed with: Patient.  Patient and/or family verbalized understanding of discharge instructions and all questions answered.  Copy of AVS reviewed with patient and/or family.  Patient will return 10/13/21 for next appointment.  Patient discharged in stable condition accompanied by: self.  Departure Mode: Ambulatory.      Ava Trujillo RN

## 2021-10-12 ENCOUNTER — NURSE TRIAGE (OUTPATIENT)
Dept: NURSING | Facility: CLINIC | Age: 21
End: 2021-10-12

## 2021-10-12 ENCOUNTER — TELEPHONE (OUTPATIENT)
Dept: MIDWIFE SERVICES | Facility: CLINIC | Age: 21
End: 2021-10-12

## 2021-10-12 NOTE — TELEPHONE ENCOUNTER
Talked with Nehal about Venofer reaction. She went to [facility] ED last night to evaluate swelling in LE after Venofer treatment. ED MD stated it was an allergic reaction to Venofer. Ankles and feet were swollen and multicolored (redish and bluish). Pruritis present. Benadryl was given in ED. It has helped.     She has 2 more Venofer infusion treatments and is wondering if she should go. I cancelled appts. Discussed that ather next appt we can go over ED paperwork. If this was a reaction to Venofer, then we will not make her continue with treatments. In the mean time, she will continue to take oral iron supplementation.     She is okay with plan of care.    Anriudh Mahoney, DNP, APRN, CNM

## 2021-10-12 NOTE — TELEPHONE ENCOUNTER
"Pt is calling.    Iron Transfusion yesterday from 2:30pm-4:00pm.  Feet started to itch 30 minutes after she was done.  Feet started to feel numb. They were swollen and filled with black and blue marks up to 3 inches above the ankles.  Was seen in the ED at [facility].  Told it was an allergic reaction to the iron transfusion. Given Benadryl and Tylenol and sent home.  Swelling has not gone down. Feet feel \"heavy but light\".   She is supposed to have another Iron transfusion tomorrow at 8:00am.  Does not want to go as she is concerned.    OB Triage Call      Is patient's OB/Midwife with the [facility] or [facility]? [facility]- Proceed with triage     Reason for call: Allergic reaction to iron transfusion. Swelling in feet bilaterally.     Assessment: Good fetal movement. Denies contractions, cramping, leaking of fluid, or vaginal bleeding. Feet are still swollen. She is able to wiggle her toes now.  They are less swollen, but still swollen. Swelling extends 3 inches above her ankles. Black and blue marks are now gone.  Denies HA, visual changes, or swelling anywhere else.    Plan: Contact CNM on call for further instructions.           CNM on call to call patient back directly to advise.    Patient plans to deliver at [facility]    Patient's primary OB Provider is Charla Sharp CNM.  [facility].    Per protocol recommendations Consult needed for [facility] MD or Midwife.  Patient's primary OB is [facility]. Paged on-call midwife for patient's primary OB clinic (refer to where patient is seen as midwives may go to multiple locations) Anirudh Mahoney to call [facility] back at 5:22pm.  Call returned at 5:23pm and advised on triage assessment. Does midwife recommend L&D evaluation? No- Per midwife on-call She will contact patient directly and call her now for further advice..     Is patient's delivering hospital on divert? No      33w3d    Estimated Date of Delivery: 2021        OB " History    Para Term  AB Living   2 0 0 0 1 0   SAB TAB Ectopic Multiple Live Births   1 0 0 0 0      # Outcome Date GA Lbr Magnus/2nd Weight Sex Delivery Anes PTL Lv   2 Current            1 SAB 20     AB, MISSED          No results found for: GBS       Nilda Hirsch 10/12/21 5:05 PM  [facility] Nurse Advisor    Reason for Disposition    MILD swelling of both ankles (i.e., pedal edema)    Additional Information    Negative: Severe difficulty breathing (e.g., struggling for each breath, speaks in single words)    Negative: Sounds like a life-threatening emergency to the triager    Negative: Followed a leg injury    Negative: [1] Small area of swelling AND [2] followed an insect bite to the area    Negative: Swelling only of ankle    Negative: Swelling only of knee    Negative: SEVERE leg swelling (e.g., swelling extends above knee, entire leg is swollen)    Negative: Chest pain    Negative: [1] Difficulty breathing AND [2] new onset or worsening    Negative: [1] Pregnant > 20 weeks AND [2] new blurred vision or vision changes    Negative: [1] Pregnant > 20 weeks AND [2] severe headache AND [3] not relieved with acetaminophen (e.g., Tylenol)    Negative: [1] Pregnant > 20 weeks AND [2] upper abdominal pain lasts > 1 hour    Negative: [1] Pregnant 23 or more weeks AND [2] baby is moving less today (e.g., kick count < 5 in 1 hour or < 10 in 2 hours)    Negative: [1] Red area or streak AND [2] fever    Negative: [1] Swelling is painful to touch AND [2] fever    Negative: [1] Cast on leg or ankle AND [2] now increased pain    Negative: Patient sounds very sick or weak to the triager    Negative: [1] Pregnant > 20 weeks AND [2] swelling of face, arm or hands  (Exception: slight puffiness of fingers)    Negative: [1] Pregnant > 20 weeks AND [2] sudden weight gain (i.e., more than 3 lbs or 1.4 kg in one week)    Negative: [1] Thigh or calf pain AND [2] only 1 side AND [3] present > 1 hour     Negative: [1] Thigh, calf, or ankle swelling AND [2] only 1 side    Negative: [1] Thigh, calf, or ankle swelling AND [2] bilateral AND [3] 1 side is more swollen    Negative: [1] Red area or streak AND [2] large (> 2 in. or 5 cm)    Negative: MODERATE leg swelling (e.g., more than just ankles, below knees)    Negative: Looks like a boil, infected sore, deep ulcer or other infected rash (spreading redness, pus)    Protocols used: PREGNANCY - LEG SWELLING AND EDEMA-A-AH    Nilda Hirsch RN  [facility] Nurse Advisor  10/12/2021 at 5:16 PM

## 2021-10-12 NOTE — ED NOTES
Patient wanted to leave, 'didn't want to wait any longer'. Pt signed declination of medical screening examination

## 2021-10-14 DIAGNOSIS — O09.93 SUPERVISION OF HIGH RISK PREGNANCY IN THIRD TRIMESTER: Primary | ICD-10-CM

## 2021-10-14 DIAGNOSIS — O26.13 LOW WEIGHT GAIN DURING PREGNANCY IN THIRD TRIMESTER: ICD-10-CM

## 2021-10-18 ENCOUNTER — ANCILLARY PROCEDURE (OUTPATIENT)
Dept: ULTRASOUND IMAGING | Facility: CLINIC | Age: 21
End: 2021-10-18
Payer: COMMERCIAL

## 2021-10-18 ENCOUNTER — PRENATAL OFFICE VISIT (OUTPATIENT)
Dept: MIDWIFE SERVICES | Facility: CLINIC | Age: 21
End: 2021-10-18
Payer: COMMERCIAL

## 2021-10-18 VITALS — SYSTOLIC BLOOD PRESSURE: 126 MMHG | WEIGHT: 122 LBS | BODY MASS INDEX: 22.68 KG/M2 | DIASTOLIC BLOOD PRESSURE: 80 MMHG

## 2021-10-18 DIAGNOSIS — O09.93 SUPERVISION OF HIGH RISK PREGNANCY IN THIRD TRIMESTER: ICD-10-CM

## 2021-10-18 DIAGNOSIS — O26.13 LOW WEIGHT GAIN DURING PREGNANCY IN THIRD TRIMESTER: ICD-10-CM

## 2021-10-18 DIAGNOSIS — K21.9 GASTROESOPHAGEAL REFLUX DISEASE WITHOUT ESOPHAGITIS: ICD-10-CM

## 2021-10-18 DIAGNOSIS — O99.013 ANEMIA DURING PREGNANCY IN THIRD TRIMESTER: ICD-10-CM

## 2021-10-18 DIAGNOSIS — Z3A.34 34 WEEKS GESTATION OF PREGNANCY: ICD-10-CM

## 2021-10-18 DIAGNOSIS — J45.30 MILD PERSISTENT ASTHMA WITHOUT COMPLICATION: Primary | ICD-10-CM

## 2021-10-18 DIAGNOSIS — R63.6 UNDERWEIGHT: ICD-10-CM

## 2021-10-18 PROCEDURE — 99207 PR PRENATAL VISIT: CPT | Performed by: ADVANCED PRACTICE MIDWIFE

## 2021-10-18 PROCEDURE — 76816 OB US FOLLOW-UP PER FETUS: CPT | Performed by: OBSTETRICS & GYNECOLOGY

## 2021-10-18 NOTE — PATIENT INSTRUCTIONS
SIGNS OF  LABOR    Labor is  if it happens more than three weeks before your due date.    It can be hard to know if you are in labor, since the symptoms can be like the normal feelings of pregnancy.  Often, the only difference is the symptoms increase or they don't go away.     Signs of  labor can include:      Contractions which can feel like period cramps or gas pain.  You may feel it in the lower part of your abdomen, in your back, or as a pressure feeling in your bottom.  It is often regular, coming every 5 or 10 minutes, and  lasting about 30-60 seconds. Some contractions are normal during pregnancy (Burnett carrasquillo contractions) but if you are feeling more than 5-6 in one hour that is NOT normal    If this occurs empty your bladder, then drink 2-3 glasses of water, eat a snack, and lay down on your left side. Put your hand on your abdomen to count the contractions.  If after one hour of resting you have still had 5-6 contractions call your clinic right away.      If you feel a pop, gush, or trickle of fluid it may mean that your bag of water has broken and you should contact the clinic       You may also experience loose stools and/or rectal pressure       Listen to your body, if something doesn't seem right please call us at the clinic    Risk Factors      Previous  delivery    Bacterial Vaginosis- if you notice a fishy smell to your discharge or experience vaginal itching/discomfort you should be evaluated for infection    Smoking    Drug abuse    Adolescent (teen) pregnancy or advanced maternal age (AMA) age 35 and over    Dehydration (this may not cause  labor but it can cause contractions)    If you think you are in  labor we may do some lab testing in the clinic or send you to the hospital for evaluation    Please call us if you are concerned you are in  labor.    [facility]  [phone]    PREECLAMPSIA SIGNS AND  "SYMPTOMS    Preeclampsia is a dangerous condition that some women develop in the second half of pregnancy. It can also begin after the baby is born.  Preeclampsia causes high blood pressure and can cause problems with many organ systems in your body.  It can also affect the growth of your baby. The exact cause of preeclampsia is unknown, however, there are signs and symptoms to watch for:    -A bad headache that doesn't improve with Tylenol  -Visual changes such as spots, flashes of light, blurry vision  -Pain in the upper right part of your abdomen, especially under the ribs that doesn't go away  -Nausea and/or vomiting  -Feeling extremely tired  -Yellowing of the skin and/or eyes  -Feeling \"not quite right\" or that something is wrong  -An extreme amount of swelling (some swelling in pregnancy is very normal)    If your midwife feels that you are developing preeclampsia, you will have lab tests drawn and will be monitored very closely.     If you are experiencing anyof these symptoms, call the [facility] immediately at [phone].    Fetal Kick Counts    It is important to know when your baby's movements occur. We often get busy with work and life and do not pay close attention to their movements.        Women typically begin feeling movement between 18-22 weeks of gestation, sometimes it can be earlier or later depending on where your placenta is       Movements usually begin feeling like popping or fluttering and as the baby grows they become more pronounced    Toward the end of pregnancy as the baby gets larger they may not move as much or make as big of movements. Babies have maturing sleep cycles as well as not as much room to move and flip. If you are ever concerned about your baby's movements or have not felt the baby move for a while, we recommend you do a fetal kick count. Prior to starting your count drink a glass of water or juice and eat a snack. Then lay down on your side and " begin to count movements.     How to do a Fetal Kick Counts    There are many different ways to monitor your baby's movements. Movements can range from large jabs to small kicks, or wiggles.  Hiccups count!      Count 10 movements in 2 hours when resting and focusing    Count 10 movements in 12 hours when doing normal activity    We recommend that if movements occur but seem decreased that you should be seen in the clinic or hospital for evaluation within 12 hours. If fetal movement is absent or fetal kick counts are low please contact us right away.    If you ever have any concerns about your baby's movements DO NOT HESITATE to call us, we are here for you!    [facility]  [phone]      1) Ferrous gluconate 325 mg per day  2) Vitamin C 250 mg per day  3) Vitamin B12 1000 mcg per day  4) Your normal prenatal vitamin and folic acid per day    Do not take iron with meals, coffee, dairy, tea, or carbonated beverages.      Thank you!        [place] [address].    Clinics: 7:30 a.m. to 5 p.m.  ER: Open 24 Hours  APPOINTMENTS: [phone] or [phone]

## 2021-10-18 NOTE — PROGRESS NOTES
"Feels ok overall, however roommate informed her she needs to move before baby is born because she does not want to have Nehal living there with a baby. Nehal's [place] after the baby was born but because of this she moved up to [place], [place] over the weekend. She does plan to deliver up there but has yet to transfer care.     Had a growth US today: preliminary results EFW 4 lb 14 oz, 24%ile, MVP 4.8 cm, cephalic, . Will send final US report and any recommendations once US is read by on call MD    Had one IV Venofer infusion on 10/11/21, approximately 10 minutes after leaving infusion center Nehal noticed that her feet go \"tingly, itching and like they were on fire.\" She then had bilateral swelling of her fetal and ankles. She presented to [facility] ED where she was evaluated for an allergic reaction. She was given benadryl and tylenol and was discharged home after symptoms improved. Reviewed notes from ED, there were no signs of anaphylaxis, her symptoms improved with tylenol and benadryl, however she still had foot swelling.     Fetal Movement: positive, denies loss of fluid/vb, a little  Contractions (x4 yesterday)  Fetal kick counts/movement reviewed    Reviewed PTL precautions and s/sx of preeclampsia; denies any S&S and aware of what to report     Depression screening done: yes- has no concerns about her mood   Have car seat: working with [employer] awaiting a call back. Encouraged to call them this week to check-in    Discussed establishing care in the [place] area, once she knows what clinic she plans to go to we can send her records there. Discussed importance of establishing care ASAP. Also discussed she needs a GBS done at 36 weeks whether that is here in our clinic or at her new clinic [place].     Discussed possible allergic/adverse reaction to Venofer. Plan to continue with oral iron supplements, advised adding Vitaminc C and " Vitamin B12 to help with yair absorption. Vitamin dosage/information printed out for Nehal to  OTC. Stressed importance of iron supplementation to help boost hemoglobin prior to delivery    Will send US report when available. Discussed possibly needing a repeat growth US in 3-4 weeks depending on fundal height measurements/weight gain.     Discussed GBS/cx check at next visit  Return to clinic 2 weeks    DAPHNE Mccauley, RANCHO

## 2021-10-26 ENCOUNTER — PATIENT OUTREACH (OUTPATIENT)
Dept: NURSING | Facility: CLINIC | Age: 21
End: 2021-10-26
Payer: COMMERCIAL

## 2021-10-26 NOTE — PROGRESS NOTES
Clinic Care Coordination Contact    Follow Up Progress Note      Assessment: GILMA CARPIO spoke with pt regarding her overall wellbeing. Discussed pt's recent move to her mother's home in [place]. She is still in the cities for another couple days and is working her last shift today.     She has set up an OB appointment there at [facility] in [place] 11/3. This OB clinic is connected to the hospital she will deliver at and she is happy about this.    She is still waiting on [employer] to get a car seat. She spoke with them a week ago. GILMA CARPIO will place a referral to [employer] to try to expedite this while she is still in the [facility].    She has been feeling ok emotionally, some ups and downs. The last 3-4 days, she has been feeling a lot of fransisco horses in her legs and some cramping where the baby's head is at the bottom of her abdomin. She has also been having some ned carrasquillo. No leaking of fluid or bleeding. GILMA CARPIO encouraged her to keep an eye on this, drink water and rest when able.     She is getting a little nervous about childbirth but mostly she is excited. She has been having fun preparing for baby and buying items.    Care Gaps:    Health Maintenance Due   Topic Date Due     PREVENTIVE CARE VISIT  Never done     ADVANCE CARE PLANNING  Never done     Pneumococcal Vaccine: Pediatrics (0 to 5 Years) and At-Risk Patients (6 to 64 Years) (1 of 2 - PPSV23) 06/04/2007     EYE EXAM  12/18/2016     ASTHMA CONTROL TEST  08/21/2017     ASTHMA ACTION PLAN  02/14/2018     COVID-19 Vaccine (2 - Moderna 2-dose series) 02/25/2021     MATERNAL SCREENING  Never done     REPEAT ANTIBODY SCREEN (OB)  09/04/2021     PHQ-9  10/23/2021     GROUP B STREP SCREENING  10/30/2021     Postponed to focus on current pregnancy     Goals addressed this encounter:   Goals Addressed                    This Visit's Progress       Patient Stated       COMPLETED: 1. Psychosocial (pt-stated)   100%      Goal Statement:  Before due date in November, I would like to explore the options for my future to support myself and my baby after birth to ensure our overall health and wellbeing.  Date Goal set: 4/16/2021  Barriers: None  Strengths: Strong support system, very motivated to ensure my wellbeing and what is best for baby  Date to Achieve By: 11/1/2021  Patient expressed understanding of goal: yes  Action steps to achieve this goal:  1. I will explore ways to manage my anxiety  2. I will present all questions related to concerns for pregnancy or baby to midwives or care team  3. I will outreach to Care Coordination  for further questions or concerns            Outreach Frequency: monthly    Plan: At this time, pt denies outstanding need for connection or referral to resources or assistance navigating recommended follow up care. No further outreaches will be made at this time unless a new referral is made or a change in the pt's status occurs. Patient was provided with [facility] contact information and encouraged to call with any questions or concerns.    Navya Torres [facility]  Clinic Care Coordinator  [facility] Diann Robeson  [facility]  [phone]  [email or web address]

## 2022-01-31 ENCOUNTER — PATIENT OUTREACH (OUTPATIENT)
Dept: OBGYN | Facility: CLINIC | Age: 22
End: 2022-01-31
Payer: COMMERCIAL

## 2022-01-31 DIAGNOSIS — R87.610 ASCUS WITH POSITIVE HIGH RISK HPV CERVICAL: ICD-10-CM

## 2022-01-31 DIAGNOSIS — R87.810 ASCUS WITH POSITIVE HIGH RISK HPV CERVICAL: ICD-10-CM

## 2022-03-01 NOTE — TELEPHONE ENCOUNTER
Patient due for Pap and HPV.    Reminder done today via telephone call - [place] north and reports she has new care provider. Encouraged to request transfer of records to new clinic if she hasn't already done so. End tracking.

## 2022-06-25 ENCOUNTER — HEALTH MAINTENANCE LETTER (OUTPATIENT)
Age: 22
End: 2022-06-25

## 2022-11-20 ENCOUNTER — HEALTH MAINTENANCE LETTER (OUTPATIENT)
Age: 22
End: 2022-11-20

## 2023-06-23 ENCOUNTER — OFFICE VISIT (OUTPATIENT)
Dept: OBGYN | Facility: CLINIC | Age: 23
End: 2023-06-23
Payer: COMMERCIAL

## 2023-06-23 VITALS — BODY MASS INDEX: 17.44 KG/M2 | DIASTOLIC BLOOD PRESSURE: 72 MMHG | WEIGHT: 93.8 LBS | SYSTOLIC BLOOD PRESSURE: 124 MMHG

## 2023-06-23 DIAGNOSIS — N64.4 BREAST PAIN, LEFT: Primary | ICD-10-CM

## 2023-06-23 PROBLEM — A56.8 CHLAMYDIA TRACHOMATIS INFECTION IN MOTHER DURING FIRST TRIMESTER OF PREGNANCY: Status: RESOLVED | Noted: 2021-06-14 | Resolved: 2023-06-23

## 2023-06-23 PROBLEM — O98.311 CHLAMYDIA TRACHOMATIS INFECTION IN MOTHER DURING FIRST TRIMESTER OF PREGNANCY: Status: RESOLVED | Noted: 2021-06-14 | Resolved: 2023-06-23

## 2023-06-23 PROBLEM — O26.13 LOW WEIGHT GAIN DURING PREGNANCY IN THIRD TRIMESTER: Status: RESOLVED | Noted: 2021-09-14 | Resolved: 2023-06-23

## 2023-06-23 PROBLEM — O09.90 SUPERVISION OF HIGH-RISK PREGNANCY: Status: RESOLVED | Noted: 2021-04-23 | Resolved: 2023-06-23

## 2023-06-23 PROBLEM — O99.013 ANEMIA DURING PREGNANCY IN THIRD TRIMESTER: Status: RESOLVED | Noted: 2021-09-30 | Resolved: 2023-06-23

## 2023-06-23 PROCEDURE — 99213 OFFICE O/P EST LOW 20 MIN: CPT | Performed by: NURSE PRACTITIONER

## 2023-06-23 NOTE — PROGRESS NOTES
SUBJECTIVE:                                                   Nehal Dutta is a 23 year old female who presents to clinic today for the following health issue(s):  Patient presents with:  Breast Problem      HPI:  New patient to me here today with concerns of left breast pain.  She had similar concerns in 2022 and underwent a left breast ultrasound that showed a 2 cm round mass at the 12 o'clock position.  It was felt to be benign.  The patient now states that she has discomfort with lying on her stomach as well as hugging.  She has a Nexplanon in place and has irregular bleeding.    She does drink 2 sodas a day.  She has a 19-month-old little girl at home whom she primarily carries on her right side.        Patient's last menstrual period was 2023..     Patient is sexually active, .  Using condoms for contraception.    reports that she has been smoking. She has never used smokeless tobacco.  Tobacco Cessation Action Plan: Information offered: Patient not interested at this time  STD testing offered?  Declined    Health maintenance updated:  yes    Today's PHQ-2 Score:       2021     2:38 PM   PHQ-2 (  [employer])   Q1: Little interest or pleasure in doing things 0   Q2: Feeling down, depressed or hopeless 0   PHQ-2 Score 0   PHQ-2 Total Score (12-17 Years)- Positive if 3 or more points; Administer PHQ-A if positive 0     Today's PHQ-9 Score:       2021     1:29 PM   PHQ-9 SCORE   PHQ-9 Total Score MyChart 8 (Mild depression)   PHQ-9 Total Score 8     Today's CARRIE-7 Score:       2021     1:29 PM   CARRIE-7 SCORE   Total Score 13 (moderate anxiety)   Total Score 13       Problem list and histories reviewed & adjusted, as indicated.  Additional history: as documented.    Patient Active Problem List   Diagnosis     Allergic conjunctivitis     Myopia     Dysmenorrhea     Mild persistent asthma without complication     Gastroesophageal reflux disease without esophagitis     ASCUS with  positive high risk HPV cervical     BMI less than 19,adult     Nicotine dependence due to vaping tobacco product     Underweight     Acute hypokalemia     Tobacco use     Past Surgical History:   Procedure Laterality Date     EYE SURGERY       NECK SURGERY  2004    cyst      Social History     Tobacco Use     Smoking status: Some Days     Packs/day: 0.00     Types: Cigarettes     Smokeless tobacco: Never     Tobacco comments:     currently vapes- 4-5 vape every day   Substance Use Topics     Alcohol use: No     Alcohol/week: 0.0 standard drinks of alcohol      Problem (# of Occurrences) Relation (Name,Age of Onset)    Diabetes (1) Maternal Uncle    Hypertension (1) Maternal Grandmother    Thyroid Disease (1) Maternal Grandmother    Breast Cancer (2) Paternal Aunt, Paternal Cousin    Myocardial Infarction (1) Father (51)    Liver Disease (1) Father    Cervical Cancer (1) Mother    No Known Problems (5) Sister, Brother, Maternal Grandfather, Paternal Grandmother, Other       Negative family history of: Cancer, Cerebrovascular Disease, Glaucoma, Macular Degeneration            Current Outpatient Medications   Medication Sig     acetaminophen (TYLENOL) 500 MG tablet Take 500-1,000 mg by mouth every 6 hours as needed for mild pain     albuterol (PROAIR HFA/PROVENTIL HFA/VENTOLIN HFA) 108 (90 Base) MCG/ACT inhaler Inhale 2 puffs into the lungs every 6 hours as needed for shortness of breath / dyspnea or wheezing     ferrous gluconate (FERGON) 324 (38 Fe) MG tablet Take 1 tablet (324 mg) by mouth daily (with breakfast)     FLOVENT  MCG/ACT inhaler Inhale 2 Puffs by mouth 2 times daily.     ipratropium - albuterol 0.5 mg/2.5 mg/3 mL (DUONEB) 0.5-2.5 (3) MG/3ML neb solution Inhale 3 mLs into the lungs     ondansetron (ZOFRAN) 4 MG tablet Take 1 tablet (4 mg) by mouth every 6 hours as needed for nausea     Prenat w/o A-FeCbGl-DSS-FA-DHA (CITRANATAL DHA) 27-1 & 250 MG MISC Take 1 tablet by mouth daily (Patient not  taking: Reported on 6/23/2023)     triamcinolone (KENALOG) 0.1 % external cream Apply topically 2 times daily as needed for irritation (Patient not taking: Reported on 6/23/2023)     No current facility-administered medications for this visit.     Allergies   Allergen Reactions     Cantaloupe [Melon]      Peanuts [Nuts]      Animal Dander Rash       ROS:  12 point review of systems negative other than symptoms noted below or in the HPI.  Breast: Tenderness  No urinary frequency or dysuria, bladder or kidney problems      OBJECTIVE:     /72   Wt 42.5 kg (93 lb 12.8 oz)   LMP 06/09/2023   BMI 17.44 kg/m    Body mass index is 17.44 kg/m .    Exam:  Constitutional:  Appearance: Well nourished, well developed alert, in no acute distress  Breasts:  Inspection of Breasts:  Symmetric bilaterally.  No puckering.  No skin changes.  Palpation of Breasts and Axillae:  No masses present on palpation, no breast tenderness.  Right breast is unremarkable.  Left breast has some tenderness at the 12 o'clock position.  With patient assistance while sitting up she targeted the area at the 12 o'clock position with deep palpation there was less than a centimeter area of what felt like glandular tissue with no discernible mass.  Axillary Lymph Nodes:  No lymphadenopathy present  Psychiatric:  Mentation appears normal and affect normal/bright.     In-Clinic Test Results:  No results found for this or any previous visit (from the past 24 hour(s)).    ASSESSMENT/PLAN:                                                        ICD-10-CM    1. Breast pain, left  N64.4 US Breast Left Limited 1-3 Quadrants          There are no Patient Instructions on file for this visit.    23-year-old female with persistent left breast tenderness around the same area.  We recommend repeat breast ultrasound and this was ordered for her today.  We did asked that she cut back on her caffeine.    DAPHNE Tran [facility]  [place]

## 2023-07-08 ENCOUNTER — HEALTH MAINTENANCE LETTER (OUTPATIENT)
Age: 23
End: 2023-07-08

## 2023-07-14 ENCOUNTER — HOSPITAL ENCOUNTER (OUTPATIENT)
Dept: ULTRASOUND IMAGING | Facility: CLINIC | Age: 23
Discharge: HOME | End: 2023-07-14
Attending: NURSE PRACTITIONER
Payer: COMMERCIAL

## 2023-07-14 DIAGNOSIS — N64.4 BREAST PAIN, LEFT: ICD-10-CM

## 2023-07-14 PROCEDURE — 76642 ULTRASOUND BREAST LIMITED: CPT | Mod: LT

## 2023-09-28 ENCOUNTER — OFFICE VISIT (OUTPATIENT)
Dept: MIDWIFE SERVICES | Facility: CLINIC | Age: 23
End: 2023-09-28
Payer: COMMERCIAL

## 2023-09-28 VITALS — SYSTOLIC BLOOD PRESSURE: 102 MMHG | DIASTOLIC BLOOD PRESSURE: 80 MMHG | WEIGHT: 97.2 LBS | BODY MASS INDEX: 18.07 KG/M2

## 2023-09-28 DIAGNOSIS — Z30.09 COUNSELING FOR BIRTH CONTROL, ORAL CONTRACEPTIVES: ICD-10-CM

## 2023-09-28 DIAGNOSIS — N92.6 IRREGULAR MENSTRUAL BLEEDING: ICD-10-CM

## 2023-09-28 DIAGNOSIS — Z30.46 ENCOUNTER FOR NEXPLANON REMOVAL: Primary | ICD-10-CM

## 2023-09-28 PROCEDURE — 99213 OFFICE O/P EST LOW 20 MIN: CPT | Mod: 25 | Performed by: ADVANCED PRACTICE MIDWIFE

## 2023-09-28 PROCEDURE — 11982 REMOVE DRUG IMPLANT DEVICE: CPT | Performed by: ADVANCED PRACTICE MIDWIFE

## 2023-09-28 RX ORDER — NORGESTIMATE AND ETHINYL ESTRADIOL 0.25-0.035
1 KIT ORAL DAILY
Qty: 90 TABLET | Refills: 0 | Status: SHIPPED | OUTPATIENT
Start: 2023-09-28

## 2023-09-28 RX ORDER — DEXTROAMPHETAMINE SACCHARATE, AMPHETAMINE ASPARTATE, DEXTROAMPHETAMINE SULFATE AND AMPHETAMINE SULFATE 5; 5; 5; 5 MG/1; MG/1; MG/1; MG/1
TABLET ORAL
COMMUNITY

## 2023-09-28 NOTE — PROGRESS NOTES
"  SUBJECTIVE:                                                   Nehal Dutta is a 23 year old who presents to clinic today for the following health issue(s):  Patient presents to discuss bleeding with Nexplanon in place.     HPI:  Bleeding for last month while Nexplanon in place  Uses pads, not heavy, but medium bleeding every day.   Changes pad \"every couple hours, maybe.\"    Prior to this month periods were regular, lasting 3-5 days. Nexplanon helps with cramping.   She is interested in Nexplanon removal today and starting a birth control pill.     No LMP recorded.  Menstrual History: frequency: every 28 days  Patient is sexually active  .  Using Nexplanon for contraception. Has tried everything except an IUD.   Would like to try a birth control pill. Has tried Depo-Provera and had non-stop bleeding  or heavy bleeding.     Health maintenance updated:  no  STI infx testing offered:  Declined    Last PHQ-9 score on record =       2021     1:29 PM   PHQ-9 SCORE   PHQ-9 Total Score MyChart 8 (Mild depression)   PHQ-9 Total Score 8     Last GAD7 score on record =       2021     1:29 PM   CARRIE-7 SCORE   Total Score 13 (moderate anxiety)   Total Score 13       Problem list and histories reviewed & adjusted, as indicated.  Additional history: as documented.    Patient Active Problem List   Diagnosis    Allergic conjunctivitis    Myopia    Dysmenorrhea    Mild persistent asthma without complication    Gastroesophageal reflux disease without esophagitis    ASCUS with positive high risk HPV cervical    BMI less than 19,adult    Nicotine dependence due to vaping tobacco product    Underweight    Acute hypokalemia    Tobacco use     Past Surgical History:   Procedure Laterality Date    EYE SURGERY      NECK SURGERY  2004    cyst      Social History     Tobacco Use    Smoking status: Some Days     Packs/day: 0.00     Types: Cigarettes    Smokeless tobacco: Never    Tobacco comments:     currently vapes- 4-5 " vape every day   Substance Use Topics    Alcohol use: No     Alcohol/week: 0.0 standard drinks of alcohol      Problem (# of Occurrences) Relation (Name,Age of Onset)    Diabetes (1) Maternal Uncle    Hypertension (1) Maternal Grandmother    Thyroid Disease (1) Maternal Grandmother    Breast Cancer (2) Paternal Aunt, Paternal Cousin    Myocardial Infarction (1) Father (51)    Liver Disease (1) Father    Cervical Cancer (1) Mother    No Known Problems (5) Sister, Brother, Maternal Grandfather, Paternal Grandmother, Other           Negative family history of: Cancer, Cerebrovascular Disease, Glaucoma, Macular Degeneration              Current Outpatient Medications   Medication Sig    acetaminophen (TYLENOL) 500 MG tablet Take 500-1,000 mg by mouth every 6 hours as needed for mild pain    albuterol (PROAIR HFA/PROVENTIL HFA/VENTOLIN HFA) 108 (90 Base) MCG/ACT inhaler Inhale 2 puffs into the lungs every 6 hours as needed for shortness of breath / dyspnea or wheezing    amphetamine-dextroamphetamine (ADDERALL) 20 MG tablet TAKE 1 TABLET BY MOUTH EVERY AM FOR 30 DAYS. TO AVOID INSOMNIA, LAST DAILY DOSE SHOULD BE TAKEN NO LESS THAN 6 HOURS BEFORE BED. EFFECTIVE 9/10/23    FLOVENT  MCG/ACT inhaler Inhale 2 Puffs by mouth 2 times daily.    ipratropium - albuterol 0.5 mg/2.5 mg/3 mL (DUONEB) 0.5-2.5 (3) MG/3ML neb solution Inhale 3 mLs into the lungs    norgestimate-ethinyl estradiol (SPRINTEC 28) 0.25-35 MG-MCG tablet Take 1 tablet by mouth daily    ondansetron (ZOFRAN) 4 MG tablet Take 1 tablet (4 mg) by mouth every 6 hours as needed for nausea    ferrous gluconate (FERGON) 324 (38 Fe) MG tablet Take 1 tablet (324 mg) by mouth daily (with breakfast) (Patient not taking: Reported on 9/28/2023)    Prenat w/o A-FeCbGl-DSS-FA-DHA (CITRANATAL DHA) 27-1 & 250 MG MISC Take 1 tablet by mouth daily (Patient not taking: Reported on 6/23/2023)    triamcinolone (KENALOG) 0.1 % external cream Apply topically 2 times daily as  needed for irritation (Patient not taking: Reported on 2023)     No current facility-administered medications for this visit.     Allergies   Allergen Reactions    Cantaloupe [Melon]     Peanuts [Nuts]     Animal Dander Rash       ROS:  12 point review of systems negative other than symptoms noted below.    OBJECTIVE:     /80   Wt 44.1 kg (97 lb 3.2 oz)   BMI 18.07 kg/m    Body mass index is 18.07 kg/m .    PHYSICAL EXAM:  Constitutional:  Appearance: Well nourished, well developed alert, in no acute distress     In-Clinic Test Results:  No results found for this or any previous visit (from the past 24 hour(s)).    ASSESSMENT/PLAN:                                                        ICD-10-CM    1. Encounter for Nexplanon removal  Z30.46       2. Counseling for birth control, oral contraceptives  Z30.09 norgestimate-ethinyl estradiol (SPRINTEC 28) 0.25-35 MG-MCG tablet      3. Irregular menstrual bleeding  N92.6           COUNSELING:    Discussed birth control options. Desires a CHC pill. She is a candidate for CHC pill. Prescription sent for 3 months.   Explained SE and risks with birth control  Advised a 3 month BP check and med check. Then will provide refills  AVS provided   If irregular bleeding or spotting continues advised reevaluation for vaginal infection. Will consider US if no infection.      NEXPLANON REMOVAL    Is a pregnancy test required: No.  Was a consent obtained?  Yes    Subjective: Nehal Dutta is a 23 year old  presents for Nexplanon removal    Patient has been given the opportunity to ask questions about all forms of birth control, including all options appropriate for Nehal Dutta. Discussed that no method of birth control, except abstinence is 100% effective against pregnancy or sexually transmitted infection.     Nehal Dutta understands planning removal  today    The entire removal procedure was reviewed with the patient, including care after.      /80   Wt  44.1 kg (97 lb 3.2 oz)   BMI 18.07 kg/m      PROCEDURE NOTE: -- Nexplanon Removal/Insertion    Technique: On the left arm  Skin prep Betadine  Anesthesia 1% lidocaine, with epi  Procedure: Patient was placed supine with left arm exposed.  Implant located by palpitation. Arm was prepped with Betadine. Incision point was anesthetized with 3 mL 1% lidocaine.     Small incision (<5mm) was made at distal end of palpable implant, curved hemostat or mosquito forceps was used to isolate the implant and bring it to the incision, the fibrous capsule containing the implant  was incised and the implant was removed intact.    EBL: minimal    Complications: none    ASSESSMENT:     ICD-10-CM    1. Encounter for Nexplanon removal  Z30.46       2. Counseling for birth control, oral contraceptives  Z30.09 norgestimate-ethinyl estradiol (SPRINTEC 28) 0.25-35 MG-MCG tablet      3. Irregular menstrual bleeding  N92.6           PLAN:    Given 's handouts, including when to have Nexplanon removed, list of danger s/sx, side effects and follow up recommended. Encouraged condom use for prevention of STD. Back up contraception advised for 7 days. Advised to call for any fever, for prolonged or severe pain or bleeding, abnormal vaginal dischage. She was advised to use pain medications (ibuprofen) as needed for mild to moderate pain.     DAPHNE Vazquze CNM    In addition to the Nexplanon removal 20 minutes of the appointment were spent evaluating and developing a treatment plan for her additional concern (change of birth control and plan if irregular spotting continues).

## 2023-09-28 NOTE — PROGRESS NOTES
Nexplanon Removal:     Is a pregnancy test required: No.  Was a consent obtained?  Yes    Nehal Dutta is here for removal of etonogestrel implant Nexplanon/Implanon    Indication: ***      Preoperative Diagnosis: etonogestrel implant  Postoperative Diagnosis: etonogestrel implant removed    Technique: On the {left/right:599481} arm  Skin prep {BETADINE/TECHNICARE:954725257}  Anesthesia {LIDOCAINE:444906460}  Procedure: Small incision (<5mm) was made at distal end of palpable implant, curved hemostat or mosquito forceps was used to isolate the implant and bring it to the incision, the fibrous capsule containing the implant  was incised and the Implant was removed intact.    Lot # ***  Exp: ***    EBL: minimal  Complications:  {YES / NO SURGERY:037097}  Tolerance:  Pt tolerated procedure well and was in stable condition.   Dressing:    A pressure bandage was placed for the next 12-24 hours.    Contraception was discussed and patient chose the following method {CONTRACEPTION:706}      Follow up: Pt was instructed to call if bleeding, severe pain or foul smell.     DAPHNE Vazquez CNM

## 2023-09-28 NOTE — PATIENT INSTRUCTIONS
Birth Control Pills    Combination birth control pills contain both estrogen and progestin.  There are numerous brands of birth control pills otherwise known as oral contraceptive pills (OCP's).  Each brand has a different combination of estrogen and progestin so every woman can find the one that is right for her.  OCP's are a safe and effective way to prevent pregnancy in most women.    How do OCP's work  OCP's work by several different mechanisms.  They cause changes in the cervix and the lining of the uterus.  The cervical mucus becomes thicker which will prevent the sperm from entering the cervix.  The lining of the uterus becomes thin which helps prevent an egg from attaching to it.  In combination, these events make it unlikely that you will get pregnant. It may also prevent ovulation completely.    Benefits of OCP's  May reduce your risk of:  Cancer of the uterus and ovary, ovarian cysts, pelvic infection, bone loss, benign breast disease, anemia, ectopic pregnancy and acne.  It may also decrease symptoms of PCOS (Polycystic Ovarian Syndrome). OCP's may also improve cramping during menstrual cycle and may make you cycle shorter and lighter.    How to take OCP's  You have several choices on how to start taking your OCP's:  You can start the pill on the first day of your next period  You can start the pill on the Sunday after your next period starts  You can start the pill on the first day it was prescribed no matter where you are in your cycle.  In this case, you will need to make sure you are not pregnant.    No matter when you start your first pack, you will always start your next pack on the same day you started your first pack.    You should take the pill at the same time every day.  Do not skip any pills.  If you miss any pills, are taking antibiotics or vomit, use a backup method of birth control until you get your next period.    Pills come in packs of 21, 28 or 91 pills:    21 Pills:  Take one pill at  the same time every day for 21 days.  Wait 7 days before beginning your next pack.  During these 7 days you will have your period.  28 Pills:  Take one pill at the same time every day for 28 days.  The last 7 pills in the pack do not contain estrogen/progestin.  During these 7 days you will have your period.    91 Pills:  Take one pill at the same time every day for 91 days.  The last 7 pills in the pack do not contain estrogen/progestin.  During these 7 days you will have your period.  With this method you will only have 4 periods a year.  Some women eventually have no bleeding at all.    Each pill pack comes with instructions.  Please make sure you read them and understand these instructions.      What to do if you miss a pill    Occasionally you may forget to take a pill or not take it on time.  Take the missed pill as soon as you remember.  Take the next pill at the regular time.  It is ok if you take two pills in one day.  You may feel a bit queasy or have some spotting, this is normal and should not be concerning.  If you have missed more than one pill use a back up method of birth control and call the clinic for instructions on how to proceed.    Who should not take Combined OCP's  If you have a history or have blood clots  A history of cerebral vascular accident (stroke)  If you have ischemic heart or coronary artery disease  Known of suspected breast cancer  Known or suspected pregnancy  Smoker and over age 35  Any know liver abnormality  Migraine headaches with an aura  Undiagnosed abnormal vaginal bleeding  High blood pressure    Common side effects when starting OCP's  Headache, nausea, dizziness, breakthrough bleeding, missed periods, tender breasts, depression and anxiety.  Most side effects are minor and resolve in the first few months. Take the pill with meals or at bedtime if nausea occurs.    Call or return for care in the following circumstances:    Unexpected missed periods or very heavy  bleeding  Persistent vaginal bleeding  Depression  Suspected pregnancy  Persistent side effects such as:  Nausea, irregular menses or mood changes.    Seek emergency care immediately for the following:  ACHES  Abdominal or pelvic pain  Chest pain  Severe headache   Visual disturbances  Severe leg pain or numbness or tingling of extremities    Lastly-  Use of a backup method is recommended for the first cycle  Condoms are recommended to protect against STI's  OCP's are 99% effective if take correctly.  The pill helps to keep your periods regular, lighter and shorter and reduces cramps.  If you desire a pregnancy, you may stop taking your OCPs.     Please call the clinic with questions and concerns  [facility]  [phone]

## 2024-08-25 ENCOUNTER — HEALTH MAINTENANCE LETTER (OUTPATIENT)
Age: 24
End: 2024-08-25

## 2024-10-02 ENCOUNTER — TRANSFERRED RECORDS (OUTPATIENT)
Dept: HEALTH INFORMATION MANAGEMENT | Facility: CLINIC | Age: 24
End: 2024-10-02

## 2024-10-23 ENCOUNTER — MEDICAL CORRESPONDENCE (OUTPATIENT)
Dept: HEALTH INFORMATION MANAGEMENT | Facility: CLINIC | Age: 24
End: 2024-10-23

## 2024-10-30 ENCOUNTER — OFFICE VISIT (OUTPATIENT)
Dept: MIDWIFE SERVICES | Facility: CLINIC | Age: 24
End: 2024-10-30
Payer: COMMERCIAL

## 2024-10-30 VITALS
DIASTOLIC BLOOD PRESSURE: 80 MMHG | BODY MASS INDEX: 17.26 KG/M2 | HEIGHT: 62 IN | WEIGHT: 93.8 LBS | SYSTOLIC BLOOD PRESSURE: 120 MMHG

## 2024-10-30 DIAGNOSIS — Z00.00 ROUTINE GENERAL MEDICAL EXAMINATION AT A HEALTH CARE FACILITY: ICD-10-CM

## 2024-10-30 DIAGNOSIS — Z13.29 SCREENING FOR THYROID DISORDER: ICD-10-CM

## 2024-10-30 DIAGNOSIS — R63.6 UNDERWEIGHT (BMI < 18.5): ICD-10-CM

## 2024-10-30 DIAGNOSIS — Z13.21 ENCOUNTER FOR VITAMIN DEFICIENCY SCREENING: ICD-10-CM

## 2024-10-30 DIAGNOSIS — Z12.4 SCREENING FOR CERVICAL CANCER: Primary | ICD-10-CM

## 2024-10-30 DIAGNOSIS — Z13.1 SCREENING FOR DIABETES MELLITUS: ICD-10-CM

## 2024-10-30 DIAGNOSIS — Z13.220 SCREENING FOR HYPERLIPIDEMIA: ICD-10-CM

## 2024-10-30 DIAGNOSIS — F41.9 ANXIETY: ICD-10-CM

## 2024-10-30 DIAGNOSIS — N63.22 MASS OF UPPER INNER QUADRANT OF LEFT BREAST: ICD-10-CM

## 2024-10-30 DIAGNOSIS — N94.9 VAGINAL LUMP: ICD-10-CM

## 2024-10-30 DIAGNOSIS — Z12.4 SCREENING FOR CERVICAL CANCER: ICD-10-CM

## 2024-10-30 LAB
ERYTHROCYTE [DISTWIDTH] IN BLOOD BY AUTOMATED COUNT: 12.4 % (ref 10–15)
EST. AVERAGE GLUCOSE BLD GHB EST-MCNC: 103 MG/DL
HBA1C MFR BLD: 5.2 % (ref 0–5.6)
HCT VFR BLD AUTO: 40 % (ref 35–47)
HGB BLD-MCNC: 12.9 G/DL (ref 11.7–15.7)
MCH RBC QN AUTO: 29.9 PG (ref 26.5–33)
MCHC RBC AUTO-ENTMCNC: 32.3 G/DL (ref 31.5–36.5)
MCV RBC AUTO: 93 FL (ref 78–100)
PLATELET # BLD AUTO: 299 10E3/UL (ref 150–450)
RBC # BLD AUTO: 4.32 10E6/UL (ref 3.8–5.2)
WBC # BLD AUTO: 6.1 10E3/UL (ref 4–11)

## 2024-10-30 PROCEDURE — 87624 HPV HI-RISK TYP POOLED RSLT: CPT | Performed by: ADVANCED PRACTICE MIDWIFE

## 2024-10-30 PROCEDURE — 82306 VITAMIN D 25 HYDROXY: CPT | Performed by: ADVANCED PRACTICE MIDWIFE

## 2024-10-30 PROCEDURE — 80061 LIPID PANEL: CPT | Performed by: ADVANCED PRACTICE MIDWIFE

## 2024-10-30 PROCEDURE — 85027 COMPLETE CBC AUTOMATED: CPT | Performed by: ADVANCED PRACTICE MIDWIFE

## 2024-10-30 PROCEDURE — G0145 SCR C/V CYTO,THINLAYER,RESCR: HCPCS | Performed by: ADVANCED PRACTICE MIDWIFE

## 2024-10-30 PROCEDURE — 36415 COLL VENOUS BLD VENIPUNCTURE: CPT | Performed by: ADVANCED PRACTICE MIDWIFE

## 2024-10-30 PROCEDURE — 83036 HEMOGLOBIN GLYCOSYLATED A1C: CPT | Performed by: ADVANCED PRACTICE MIDWIFE

## 2024-10-30 PROCEDURE — 99213 OFFICE O/P EST LOW 20 MIN: CPT | Mod: 25 | Performed by: ADVANCED PRACTICE MIDWIFE

## 2024-10-30 PROCEDURE — 99395 PREV VISIT EST AGE 18-39: CPT | Performed by: ADVANCED PRACTICE MIDWIFE

## 2024-10-30 PROCEDURE — 84443 ASSAY THYROID STIM HORMONE: CPT | Performed by: ADVANCED PRACTICE MIDWIFE

## 2024-10-30 PROCEDURE — 99459 PELVIC EXAMINATION: CPT | Performed by: ADVANCED PRACTICE MIDWIFE

## 2024-10-30 ASSESSMENT — ANXIETY QUESTIONNAIRES
1. FEELING NERVOUS, ANXIOUS, OR ON EDGE: SEVERAL DAYS
7. FEELING AFRAID AS IF SOMETHING AWFUL MIGHT HAPPEN: NOT AT ALL
2. NOT BEING ABLE TO STOP OR CONTROL WORRYING: NOT AT ALL
GAD7 TOTAL SCORE: 3
3. WORRYING TOO MUCH ABOUT DIFFERENT THINGS: SEVERAL DAYS
GAD7 TOTAL SCORE: 3
5. BEING SO RESTLESS THAT IT IS HARD TO SIT STILL: NOT AT ALL
6. BECOMING EASILY ANNOYED OR IRRITABLE: SEVERAL DAYS

## 2024-10-30 ASSESSMENT — PATIENT HEALTH QUESTIONNAIRE - PHQ9
SUM OF ALL RESPONSES TO PHQ QUESTIONS 1-9: 3
5. POOR APPETITE OR OVEREATING: NOT AT ALL

## 2024-10-30 NOTE — PROGRESS NOTES
Nehal is a 24 year old  female who presents for annual exam.     Besides routine health maintenance,  she would like to discuss cyst like bump around clitoritis area also concerns about being so lightweight, tries to eat high protein/fat diet and works out without any weight gain.  Also mentions a breast lump that has been there for years and was told by an xray that it was likely normal and could go away but she could have it removed if she wanted.Thinks it has gotten smaller.   Worried about cervical cancer because her mother had it and so did her boyfriends mother.  Just started zoloft for anxiety, worried it may make her change in personality or not be herself.   Menses are regular q 28-30 days and normal lasting 5 days.   Menses flow: normal.    Patient's last menstrual period was 10/23/2024 (approximate)..   Using none for contraception.    She is not currently considering pregnancy.    REPRODUCTIVE/GYNECOLOGIC HISTORY:  Nehal is sexually active with male partner(s) and is currently in monogamous relationship.   STI testing offered?  Declined  History of abnormal Pap smear:  Yes ASCUS with HPV high risk positive.  SOCIAL HISTORY  Abuse: does not report having previously been physical or sexually abused.    Do you feel safe in your environment? YES       She  reports that she has been smoking cigarettes. She has never used smokeless tobacco.      Last PHQ-9 score on record =       10/30/2024    11:40 AM   PHQ-9 SCORE   PHQ-9 Total Score 3     Last GAD7 score on record =       10/30/2024    11:40 AM   CARRIE-7 SCORE   Total Score 3     Alcohol Score = 1    HEALTH MAINTENANCE:    Care Gaps  Close care gaps  Overdue          Never done YEARLY PREVENTIVE VISIT (Yearly)     Never done ADVANCE CARE PLANNING (Every 5 Years)     2007 Pneumococcal Vaccine: Pediatrics (0 to 5 Years) and At-Risk Patients (6 to 64 Years) (1 of 1 - PPSV23 or PCV20)  Last completed: Dec 6, 2002     AUG 21  2017 ASTHMA CONTROL TEST  (Every 6 Months)  Last completed:  ASTHMA ACTION PLAN (Yearly)  Last completed: 2017     SEP 28  2021 EYE EXAM (Yearly)   Last completed: Sep 28, 2020     FEB 15  2022 PAP FOLLOW-UP (Once)  Last completed: Feb 15, 2021     SEP 16  2022 CHLAMYDIA SCREENING (Yearly)  Last completed: Sep 16, 2021     MATEO 1  2024 PHQ-2 (once per calendar year) (Yearly, January to December)  Last completed: Aug 6, 2021     SEP 1  2024 INFLUENZA VACCINE (1)  Last completed: Sep 30, 2021     SEP 1  2024 COVID-19 Vaccine (3 - 2024-25 season)   Last completed: 2021       HEALTHY HABITS  Eating habits: eats regular meals, follows a balanced nutrition diet, and has adequate calcium intake  Calcium/Vitamin D intake: source:  dairy Adequate? likely  Exercise: How often do you exercise? 3-5 times/week;Walking        HISTORY:  OB History    Para Term  AB Living   2 0 0 0 1 0   SAB IAB Ectopic Multiple Live Births   1 0 0 0 0      # Outcome Date GA Lbr Magnus/2nd Weight Sex Type Anes PTL Lv   2             1 SAB 20     AB, MISSED        Past Medical History:   Diagnosis Date    Abnormal Pap smear of cervix 10/02/2019    See problem list    Depressive disorder     Dysmenorrhea 2016    Uncomplicated asthma      Past Surgical History:   Procedure Laterality Date    EYE SURGERY      NECK SURGERY  2004    cyst     Family History   Problem Relation Age of Onset    Diabetes Maternal Uncle     Hypertension Maternal Grandmother     Thyroid Disease Maternal Grandmother     Cervical Cancer Mother     Myocardial Infarction Father 51    Liver Disease Father     No Known Problems Sister     No Known Problems Brother     No Known Problems Maternal Grandfather     No Known Problems Paternal Grandmother     No Known Problems Other     Breast Cancer Paternal Aunt     Breast Cancer Paternal Cousin     Cancer No family hx of     Cerebrovascular Disease No family hx of     Glaucoma No family hx of      Macular Degeneration No family hx of      Social History     Socioeconomic History    Marital status: Single    Number of children: 0   Occupational History    Occupation: student     Comment: [employer]   Tobacco Use    Smoking status: Some Days     Types: Cigarettes    Smokeless tobacco: Never    Tobacco comments:     currently vapes- 4-5 vape every day   Vaping Use    Vaping status: Every Day   Substance and Sexual Activity    Alcohol use: No     Alcohol/week: 0.0 standard drinks of alcohol    Drug use: Not Currently     Comment: former mrijuana user    Sexual activity: Not Currently     Partners: Male     Social Drivers of Health     Financial Resource Strain: Low Risk  (7/12/2023)    Received from [employer] [facility]    Overall Financial Resource Strain (CARDIA)     Difficulty of Paying Living Expenses: Not hard at all   Food Insecurity: No Food Insecurity (7/12/2023)    Received from [employer] [facility]    Hunger Vital Sign     Worried About Running Out of Food in the Last Year: Never true     Ran Out of Food in the Last Year: Never true   Transportation Needs: No Transportation Needs (7/12/2023)    Received from [employer] [facility]    PRAPARE - Transportation     Lack of Transportation (Medical): No     Lack of Transportation (Non-Medical): No   Stress: Stress Concern Present (4/16/2021)    [employer] [place] [employer] - Occupational Stress Questionnaire     Feeling of Stress : To some extent   Social Connections: Socially Integrated (6/15/2023)    Received from [facility]    Social Connections     Frequency of Communication with Friends and Family: 0   Housing Stability: Low Risk  (6/15/2023)    Received from [facility]    Housing Stability     Unable to Pay for Housing in the Last Year: 1       Current Outpatient Medications:     acetaminophen  "(TYLENOL) 500 MG tablet, Take 500-1,000 mg by mouth every 6 hours as needed for mild pain, Disp: , Rfl:     albuterol (PROAIR HFA/PROVENTIL HFA/VENTOLIN HFA) 108 (90 Base) MCG/ACT inhaler, Inhale 2 puffs into the lungs every 6 hours as needed for shortness of breath / dyspnea or wheezing, Disp: 8.5 g, Rfl: 1    amphetamine-dextroamphetamine (ADDERALL) 20 MG tablet, TAKE 1 TABLET BY MOUTH EVERY AM FOR 30 DAYS. TO AVOID INSOMNIA, LAST DAILY DOSE SHOULD BE TAKEN NO LESS THAN 6 HOURS BEFORE BED. EFFECTIVE 9/10/23, Disp: , Rfl:     FLOVENT  MCG/ACT inhaler, Inhale 2 Puffs by mouth 2 times daily., Disp: , Rfl: 5    ondansetron (ZOFRAN) 4 MG tablet, Take 1 tablet (4 mg) by mouth every 6 hours as needed for nausea, Disp: 30 tablet, Rfl: 1    ferrous gluconate (FERGON) 324 (38 Fe) MG tablet, Take 1 tablet (324 mg) by mouth daily (with breakfast) (Patient not taking: Reported on 10/30/2024), Disp: 60 tablet, Rfl: 1    Prenat w/o A-FeCbGl-DSS-FA-DHA (CITRANATAL DHA) 27-1 & 250 MG MISC, Take 1 tablet by mouth daily (Patient not taking: Reported on 10/30/2024), Disp: 90 each, Rfl: 3    triamcinolone (KENALOG) 0.1 % external cream, Apply topically 2 times daily as needed for irritation (Patient not taking: Reported on 10/30/2024), Disp: 45 g, Rfl: 1     Allergies   Allergen Reactions    Cantaloupe [Melon]     Peanuts [Nuts]     Animal Dander Rash       Past medical, surgical, social and family history were reviewed and updated in EPIC.    ROS:   12 point review of systems negative other than symptoms noted below or in the HPI.    PHYSICAL EXAM:  /80   Ht 1.575 m (5' 2\")   Wt 42.5 kg (93 lb 12.8 oz)   LMP 10/23/2024 (Approximate)   Breastfeeding No   BMI 17.16 kg/m     BMI: Body mass index is 17.16 kg/m .  Constitutional: healthy, alert and no distress  Neck: symmetrical, thyroid normal size, no masses present, no lymphadenopathy present.   Cardiovascular: RRR, no murmurs present  Respiratory: breathing " unlabored, lungs CTA bilaterally  Breast: mass left breast feels approximately 1 x 2 cm oblong in shape, mobile, non-tender at approximately the 1 o'clock position.  Gastrointestinal: abdomen soft, non-tender, bowel sounds present  PELVIC EXAM:  Vulva: No lesions, no adenopathy, BUS WNL, left side of MONS pubis near clitoral becerra there is a skin colored lump, tender to touch approximately 0.5 cm circular region.  Vagina: Moist, pink, discharge normal  well rugated, no lesions  Cervix:smooth, pink, no visible lesions  Uterus: Normal size, non-tender, mobile  Ovaries: No masses palpated  Rectal exam: deferred    ASSESSMENT/PLAN:    ICD-10-CM    1. Screening for cervical cancer  Z12.4 HPV and Gynecologic Cytology Panel - Recommended Age 30-65 Years      2. Screening for anemi  Z12.4 CBC with platelets      3. Encounter for vitamin deficiency screening  Z13.21 Vitamin D deficiency screening      4. Screening for hyperlipidemia  Z13.220 Lipid panel reflex to direct LDL Fasting      5. Screening for diabetes mellitus  Z13.1 Hemoglobin A1c      6. Screening for thyroid disorder  Z13.29 TSH with free T4 reflex      7. Underweight (BMI < 18.5)  R63.6 Adult Endocrinology  Referral    Z68.1       8. Vaginal lump  N94.9 Ob/Gyn  Referral      9. Mass of upper inner quadrant of left breast  N63.22 US Breast Left Limited 1-3 Quadrants        No results found for any visits on 10/30/24.      COUNSELING:   Reviewed preventive health counseling, as reflected in patient instructions  Special attention given to:        Regular exercise       Healthy diet/nutrition       Osteoporosis prevention/bone health   reports that she has been smoking cigarettes. She has never used smokeless tobacco.  Tobacco Cessation Action Plan: Information offered: Patient not interested at this time  Breast ultrasound for breast lump   OBGYN consult for vaginal lump.  Endocrine consult for weight management.   Discussed zoloft and  "encouraged to continue, reassurance given that her personality should not \"change\".    DAPHNE FarrarM    "

## 2024-10-30 NOTE — PATIENT INSTRUCTIONS
Patient Education   Preventive Care Advice   This is general advice given by our system to help you stay healthy. However, your care team may have specific advice just for you. Please talk to your care team about your preventive care needs.  Nutrition  Eat 5 or more servings of fruits and vegetables each day.  Try wheat bread, brown rice and whole grain pasta (instead of white bread, rice, and pasta).  Get enough calcium and vitamin D. Check the label on foods and aim for 100% of the RDA (recommended daily allowance).  Lifestyle  Exercise at least 150 minutes each week  (30 minutes a day, 5 days a week).  Do muscle strengthening activities 2 days a week. These help control your weight and prevent disease.  No smoking.  Wear sunscreen to prevent skin cancer.  Have a dental exam and cleaning every 6 months.  Yearly exams  See your health care team every year to talk about:  Any changes in your health.  Any medicines your care team has prescribed.  Preventive care, family planning, and ways to prevent chronic diseases.  Shots (vaccines)   HPV shots (up to age 26), if you've never had them before.  Hepatitis B shots (up to age 59), if you've never had them before.  COVID-19 shot: Get this shot when it's due.  Flu shot: Get a flu shot every year.  Tetanus shot: Get a tetanus shot every 10 years.  Pneumococcal, hepatitis A, and RSV shots: Ask your care team if you need these based on your risk.  Shingles shot (for age 50 and up)  General health tests  Diabetes screening:  Starting at age 35, Get screened for diabetes at least every 3 years.  If you are younger than age 35, ask your care team if you should be screened for diabetes.  Cholesterol test: At age 39, start having a cholesterol test every 5 years, or more often if advised.  Bone density scan (DEXA): At age 50, ask your care team if you should have this scan for osteoporosis (brittle bones).  Hepatitis C: Get tested at least once in your life.  STIs (sexually  transmitted infections)  Before age 24: Ask your care team if you should be screened for STIs.  After age 24: Get screened for STIs if you're at risk. You are at risk for STIs (including HIV) if:  You are sexually active with more than one person.  You don't use condoms every time.  You or a partner was diagnosed with a sexually transmitted infection.  If you are at risk for HIV, ask about PrEP medicine to prevent HIV.  Get tested for HIV at least once in your life, whether you are at risk for HIV or not.  Cancer screening tests  Cervical cancer screening: If you have a cervix, begin getting regular cervical cancer screening tests starting at age 21.  Breast cancer scan (mammogram): If you've ever had breasts, begin having regular mammograms starting at age 40. This is a scan to check for breast cancer.  Colon cancer screening: It is important to start screening for colon cancer at age 45.  Have a colonoscopy test every 10 years (or more often if you're at risk) Or, ask your provider about stool tests like a FIT test every year or Cologuard test every 3 years.  To learn more about your testing options, visit:   .  For help making a decision, visit:   https://bit.ly/le58733.  Prostate cancer screening test: If you have a prostate, ask your care team if a prostate cancer screening test (PSA) at age 55 is right for you.  Lung cancer screening: If you are a current or former smoker ages 50 to 80, ask your care team if ongoing lung cancer screenings are right for you.  For informational purposes only. Not to replace the advice of your health care provider. Copyright   2023 [facility]. All rights reserved. Clinically reviewed by the [facility] Transitions Program. [employer] [id number] - REV 01/24.  Preventive Care Advice   This is general advice given by our system to help you stay healthy. However, your care team may have specific advice just for you. Please talk to your care team about your preventive  care needs.  Nutrition  Eat 5 or more servings of fruits and vegetables each day.  Try wheat bread, brown rice and whole grain pasta (instead of white bread, rice, and pasta).  Get enough calcium and vitamin D. Check the label on foods and aim for 100% of the RDA (recommended daily allowance).  Lifestyle  Exercise at least 150 minutes each week  (30 minutes a day, 5 days a week).  Do muscle strengthening activities 2 days a week. These help control your weight and prevent disease.  No smoking.  Wear sunscreen to prevent skin cancer.  Have a dental exam and cleaning every 6 months.  Yearly exams  See your health care team every year to talk about:  Any changes in your health.  Any medicines your care team has prescribed.  Preventive care, family planning, and ways to prevent chronic diseases.  Shots (vaccines)   HPV shots (up to age 26), if you've never had them before.  Hepatitis B shots (up to age 59), if you've never had them before.  COVID-19 shot: Get this shot when it's due.  Flu shot: Get a flu shot every year.  Tetanus shot: Get a tetanus shot every 10 years.  Pneumococcal, hepatitis A, and RSV shots: Ask your care team if you need these based on your risk.  Shingles shot (for age 50 and up)  General health tests  Diabetes screening:  Starting at age 35, Get screened for diabetes at least every 3 years.  If you are younger than age 35, ask your care team if you should be screened for diabetes.  Cholesterol test: At age 39, start having a cholesterol test every 5 years, or more often if advised.  Bone density scan (DEXA): At age 50, ask your care team if you should have this scan for osteoporosis (brittle bones).  Hepatitis C: Get tested at least once in your life.  STIs (sexually transmitted infections)  Before age 24: Ask your care team if you should be screened for STIs.  After age 24: Get screened for STIs if you're at risk. You are at risk for STIs (including HIV) if:  You are sexually active with more  than one person.  You don't use condoms every time.  You or a partner was diagnosed with a sexually transmitted infection.  If you are at risk for HIV, ask about PrEP medicine to prevent HIV.  Get tested for HIV at least once in your life, whether you are at risk for HIV or not.  Cancer screening tests  Cervical cancer screening: If you have a cervix, begin getting regular cervical cancer screening tests starting at age 21.  Breast cancer scan (mammogram): If you've ever had breasts, begin having regular mammograms starting at age 40. This is a scan to check for breast cancer.  Colon cancer screening: It is important to start screening for colon cancer at age 45.  Have a colonoscopy test every 10 years (or more often if you're at risk) Or, ask your provider about stool tests like a FIT test every year or Cologuard test every 3 years.  To learn more about your testing options, visit:   .  For help making a decision, visit:   https://bit.ly/rd27522.  Prostate cancer screening test: If you have a prostate, ask your care team if a prostate cancer screening test (PSA) at age 55 is right for you.  Lung cancer screening: If you are a current or former smoker ages 50 to 80, ask your care team if ongoing lung cancer screenings are right for you.  For informational purposes only. Not to replace the advice of your health care provider. Copyright   2023 [employer]. All rights reserved. Clinically reviewed by the [facility] Transitions Program. [employer] [id number] - REV 01/24.

## 2024-10-31 LAB
CHOLEST SERPL-MCNC: 127 MG/DL
FASTING STATUS PATIENT QL REPORTED: NO
HDLC SERPL-MCNC: 56 MG/DL
LDLC SERPL CALC-MCNC: 60 MG/DL
NONHDLC SERPL-MCNC: 71 MG/DL
TRIGL SERPL-MCNC: 54 MG/DL
TSH SERPL DL<=0.005 MIU/L-ACNC: 0.47 UIU/ML (ref 0.3–4.2)
VIT D+METAB SERPL-MCNC: 13 NG/ML (ref 20–50)

## 2024-11-01 LAB
HPV HR 12 DNA CVX QL NAA+PROBE: NEGATIVE
HPV16 DNA CVX QL NAA+PROBE: NEGATIVE
HPV18 DNA CVX QL NAA+PROBE: NEGATIVE
HUMAN PAPILLOMA VIRUS FINAL DIAGNOSIS: NORMAL

## 2024-11-05 LAB
BKR AP ASSOCIATED HPV REPORT: NORMAL
BKR LAB AP GYN ADEQUACY: NORMAL
BKR LAB AP GYN INTERPRETATION: NORMAL
BKR LAB AP PREVIOUS ABNL DX: NORMAL
BKR LAB AP PREVIOUS ABNORMAL: NORMAL
PATH REPORT.COMMENTS IMP SPEC: NORMAL
PATH REPORT.COMMENTS IMP SPEC: NORMAL
PATH REPORT.RELEVANT HX SPEC: NORMAL

## 2024-11-18 ENCOUNTER — PATIENT OUTREACH (OUTPATIENT)
Dept: MIDWIFE SERVICES | Facility: CLINIC | Age: 24
End: 2024-11-18
Payer: COMMERCIAL

## 2024-11-18 NOTE — TELEPHONE ENCOUNTER
Sarina - please advise  ([facility] Medical Lead: Dr. Rose)    3rd attempt for provider review of pap results (tele enc created per [facility]-RN process).     Lizett Jarrett RN  11/6/2024 11:42 AM CST       Eric Branch,     24 year old with NIL pap, neg HR HPV result.  Pt hx is outside guidelines due to early screening with abnormal results (including HPV testing < age 25)  and gap in care. See below     Please advise if you recommend routine 3 year pap vs other plan.     Pap Hx:  10/2/19 ASCUS pap, +HR HPV, not 16/18 @ age 19 yrs. Plan: 6 month co-test  2/15/21 NIL pap at 20 years old. Plan pap in 1 year per provider due bef 2/15/22.  03/01/22 Pt moved and now has a new care provider. End tracking  10/30/24 NIL pap, neg HR HPV. Plan     Thanks!  Lizett Jarrett, JOHN, RN

## 2024-11-27 ENCOUNTER — MEDICAL CORRESPONDENCE (OUTPATIENT)
Dept: HEALTH INFORMATION MANAGEMENT | Facility: CLINIC | Age: 24
End: 2024-11-27
Payer: COMMERCIAL

## 2024-11-27 ENCOUNTER — TRANSFERRED RECORDS (OUTPATIENT)
Dept: HEALTH INFORMATION MANAGEMENT | Facility: CLINIC | Age: 24
End: 2024-11-27
Payer: COMMERCIAL

## 2024-12-17 ENCOUNTER — DOCUMENTATION ONLY (OUTPATIENT)
Dept: OTOLARYNGOLOGY | Facility: CLINIC | Age: 24
End: 2024-12-17
Payer: COMMERCIAL

## 2024-12-17 NOTE — PROGRESS NOTES
Records Requested 12/17/2024 at 10:26 AM  [id number]   Comment JUDITH [facility] Radiology CT      Imaging report in Care Everywhere. Image requested.    11/14/2024 CT head, CTA head neck    Image resolved in PACS

## 2025-04-03 ENCOUNTER — VIRTUAL VISIT (OUTPATIENT)
Dept: OBGYN | Facility: CLINIC | Age: 25
End: 2025-04-03
Payer: COMMERCIAL

## 2025-04-03 DIAGNOSIS — O09.91 SUPERVISION OF HIGH RISK PREGNANCY IN FIRST TRIMESTER: Primary | ICD-10-CM

## 2025-04-03 DIAGNOSIS — O36.80X0 PREGNANCY WITH INCONCLUSIVE FETAL VIABILITY: ICD-10-CM

## 2025-04-03 PROBLEM — O09.90 SUPERVISION OF HIGH-RISK PREGNANCY: Status: ACTIVE | Noted: 2025-04-03

## 2025-04-03 RX ORDER — PRENATAL VIT/IRON FUM/FOLIC AC 27MG-0.8MG
1 TABLET ORAL DAILY
Qty: 90 TABLET | Refills: 3 | Status: SHIPPED | OUTPATIENT
Start: 2025-04-03

## 2025-04-03 RX ORDER — LANOLIN ALCOHOL/MO/W.PET/CERES
100 CREAM (GRAM) TOPICAL DAILY
Qty: 30 TABLET | Refills: 5 | Status: SHIPPED | OUTPATIENT
Start: 2025-04-03

## 2025-04-03 NOTE — PROGRESS NOTES
SUBJECTIVE:     HPI:    This is a 24 year old female patient,  who presents for her first obstetrical visit.    ASHISH: 2025, by Last Menstrual Period.  She is 6w5d weeks.  Her cycles are regular.  Her last menstrual period was normal.   Since her LMP, she has experienced  nausea, emesis, and fatigue).   She denies abdominal pain, headache, loss of appetite, vaginal discharge, dysuria, pelvic pain, urinary urgency, lightheadedness, urinary frequency, vaginal bleeding, hemorrhoids, and constipation.    Additional History: Second Pregnancy  Hx: <BMI 19, Asthma, ADHD--Adderall 20mg  PreE with last pregnancy, IOL @ 37+ wks, baseline labs ordered  Concerned with probability this could happen again  Does not hemorrhage PP  - 855 mL QBL  - Interventions: IV pitocin, IM hemabate, [place] misoprostol, fundal massage     Lots of questions regarding Adderall in pregnancy  PNV, B6, and Unisom sent via Rx  Struggling with N/V, recommend in person visit if needed.    States did smoke marijuana with last pregnancy, not currently  Does vape daily    States has had iron infusion in the past. Denies reaction but when she got out to her car reports her feet went numb and when she looked down her legs from knees down were purple-- unsure if this was related in infusion    Living with FOB and daughter.  Different FOB from last delivery, first child for him  Pt states her father  at age 51 due to cardiac issues      Have you travelled during the pregnancy?No  Have your sexual partner(s) travelled during the pregnancy?No      HISTORY:   Planned Pregnancy: Yes  Marital Status: Single  Occupation: CNA,   Living in Household: Significant Other and Children    Past History:  Her past medical history   Past Medical History:   Diagnosis Date    Abnormal Pap smear of cervix 10/02/2019    See problem list    ADHD (attention deficit hyperactivity disorder)     Depressive disorder     Dysmenorrhea 2016    History of  From: Cody Vicente  To: Dr. Sushil Vizcaino  Sent: 8/15/2023 2:59 AM EDT  Subject: Question regarding TSH    Does this mean I have Hypothyroidism? What do I do now? pre-eclampsia in prior pregnancy, currently pregnant     Uncomplicated asthma    .      She has a history of  preeclampsia and hemorrhage    Since her last LMP she denies use of alcohol, tobacco and street drugs.    Past medical, surgical, social and family history were reviewed and updated in [facility].      Current Outpatient Medications   Medication Sig Dispense Refill    amphetamine-dextroamphetamine (ADDERALL) 20 MG tablet TAKE 1 TABLET BY MOUTH EVERY AM FOR 30 DAYS. TO AVOID INSOMNIA, LAST DAILY DOSE SHOULD BE TAKEN NO LESS THAN 6 HOURS BEFORE BED. EFFECTIVE 9/10/23      acetaminophen (TYLENOL) 500 MG tablet Take 500-1,000 mg by mouth every 6 hours as needed for mild pain      albuterol (PROAIR HFA/PROVENTIL HFA/VENTOLIN HFA) 108 (90 Base) MCG/ACT inhaler Inhale 2 puffs into the lungs every 6 hours as needed for shortness of breath / dyspnea or wheezing 8.5 g 1    FLOVENT  MCG/ACT inhaler Inhale 2 Puffs by mouth 2 times daily.  5    ondansetron (ZOFRAN) 4 MG tablet Take 1 tablet (4 mg) by mouth every 6 hours as needed for nausea 30 tablet 1     No current facility-administered medications for this visit.       ROS:   12 point review of systems negative other than symptoms noted below or in the HPI.  Constitutional: Fatigue  Gastrointestinal: Nausea and Vomiting      OBJECTIVE:     EXAM:  LMP 02/15/2025  There is no height or weight on file to calculate BMI.      ASSESSMENT/PLAN:       ICD-10-CM    1. Supervision of high risk pregnancy in first trimester  O09.91           24 year old , 6w5d weeks of pregnancy with ASHSIH of 2025, by Last Menstrual Period      Confirmed patient DESIRES delivery at [facility] with the [facility] provider.    Nurse phone visit completed. Prenatal book and folder (containing standard educational hand-outs and brochures) will be given at next visit to patient. Information in folder reviewed over the phone. Questions answered. Brochure given  on optional screening available to assess chromosomal anomalies. Pt unsure NIPS. Pt advised to call the clinic if she has any questions or concerns related to her pregnancy. Prenatal labs future ordered. New prenatal visit scheduled  with Sheri Garcia RN on 4/3/2025 at 1:47 PM   [facility]  .    Lab Results   Component Value Date    PAP NIL 02/15/2021       PHQ-9 score:        10/30/2024    11:40 AM   PHQ   PHQ-9 Total Score 3   Q9: Thoughts of better off dead/self-harm past 2 weeks Not at all           2/13/2017     3:52 PM 4/23/2021     1:29 PM 10/30/2024    11:40 AM   CARRIE-7 SCORE   Total Score  13 (moderate anxiety)    Total Score 8 13 3           Patient supplied answers from flow sheet for:  Prenatal OB Questionnaire.  Past Medical History  Have you ever recieved care for your mental health? : (!) Yes  Have you ever been in a major accident or suffered serious trauma?: (!) Yes  Within the last year, has anyone hit, slapped, kicked or otherwise hurt you?: No  In the last year, has anyone forced you to have sex when you didn't want to?: No    Past Medical History 2   Have you ever received a blood transfusion?: No  Would you accept a blood transfusion if was medically recommended?: Yes  Does anyone in your home smoke?: (!) Yes   Is your blood type Rh negative?: No  Have you ever ?: No  Have you been hospitalized for a nonsurgical reason excluding normal delivery?: (!) Yes  Have you ever had an abnormal pap smear?: (!) Yes    Past Medical History (Continued)  Do you have a history of abnormalities of the uterus?: No  Did your mother take STEPHAN or any other hormones when she was pregnant with you?: Unknown  Do you have any other problems we have not asked about which you feel may be important to this pregnancy?: No

## 2025-04-03 NOTE — PATIENT INSTRUCTIONS
Learning About Pregnancy  Your Care Instructions     Your health in the early weeks of your pregnancy is particularly important for your baby's health. Take good care of yourself. Anything you do that harms your body can also harm your baby.  Make sure to go to all of your doctor appointments. Regular checkups will help keep you and your baby healthy.  How can you care for yourself at home?  Diet    Choose healthy foods like fruits, vegetables, whole grains, lean proteins, and healthy fats.     Choose foods that are good sources of calcium, iron, and folate. You can try dairy products, dark leafy greens, fortified orange juice and cereals, almonds, broccoli, dried fruit, and beans.     Do not skip meals or go for many hours without eating. If you are nauseated, try to eat a small, healthy snack every 2 to 3 hours.     Avoid fish that are high in mercury. These include shark, swordfish, barrera mackerel, marlin, orange roughy, and bigeye tuna, as well as tilefish from the [facility].     It's okay to eat up to 8 to 12 ounces a week of fish that are low in mercury or up to 4 ounces a week of fish that have medium levels of mercury. Some fish that are low in mercury are salmon, shrimp, canned light tuna, cod, and tilapia. Some fish that have medium levels of mercury are halibut and white albacore tuna.     Drink plenty of fluids. If you have kidney, heart, or liver disease and have to limit fluids, talk with your doctor before you increase the amount of fluids you drink.     Limit caffeine to about 200 to 300 mg per day. On average, a cup of brewed coffee has around 80 to 100 mg of caffeine.     Do not drink alcohol, such as beer, wine, or hard liquor.     Take a multivitamin that contains at least 400 micrograms (mcg) of folic acid to help prevent birth defects. Fortified cereal and whole wheat bread are good additional sources of folic acid.     Increase the calcium in your diet. Try to drink a quart of skim milk  each day. You may also take calcium supplements and choose foods such as cheese and yogurt.   Lifestyle    Make sure you go to your follow-up appointments.     Get plenty of rest. You may be unusually tired while you are pregnant.     Get at least 30 minutes of exercise on most days of the week. Walking is a good choice. If you have not exercised in the past, start out slowly. Take several short walks each day.     Do not smoke. If you need help quitting, talk to your doctor about stop-smoking programs. These can increase your chances of quitting for good.     Do not touch cat feces or litter boxes. Also, wash your hands after you handle raw meat, and fully cook all meat before you eat it. Wear gloves when you work in the yard or garden, and wash your hands well when you are done. Cat feces, raw or undercooked meat, and contaminated dirt can cause an infection that may harm your baby or lead to a miscarriage.     Avoid things that can make your body too hot and may be harmful to your baby, such as a hot tub or sauna. Or talk with your doctor before doing anything that raises your body temperature. Your doctor can tell you if it's safe.     Avoid chemical fumes, paint fumes, or poisons.     Do not use illegal drugs, marijuana, or alcohol.   Medicines    Review all of your medicines with your doctor. Some of your routine medicines may need to be changed to protect your baby.     Use acetaminophen (Tylenol) to relieve minor problems, such as a mild headache or backache or a mild fever with cold symptoms. Do not use nonsteroidal anti-inflammatory drugs (NSAIDs), such as ibuprofen (Advil, Motrin) or naproxen (Aleve), unless your doctor says it is okay.     Do not take two or more pain medicines at the same time unless the doctor told you to. Many pain medicines have acetaminophen, which is Tylenol. Too much acetaminophen (Tylenol) can be harmful.     Take your medicines exactly as prescribed. Call your doctor if you  "think you are having a problem with your medicine.   To manage morning sickness    Keep food in your stomach, but not too much at once. Try eating five or six small meals a day instead of three large meals.     For nausea when you wake up, eat a small snack, such as a couple of crackers or pretzels, before rising. Allow a few minutes for your stomach to settle before you slowly get up.     Try to avoid smells and foods that make you feel nauseated. High-fat or greasy foods, milk, and coffee may make nausea worse. Some foods that may be easier to tolerate include cold, spicy, sour, and salty foods.     Drink enough fluids. Water and other caffeine-free drinks are good choices.     Take your prenatal vitamins at night on a full stomach.     Try foods and drinks made with juana. Juana may help with nausea.     Get lots of rest. Morning sickness may be worse when you are tired.     Talk to your doctor about over-the-counter products, such as vitamin B6 or doxylamine, to help relieve symptoms.     Try a P6 acupressure wrist band. These anti-nausea wristbands help some people.   Follow-up care is a key part of your treatment and safety. Be sure to make and go to all appointments, and call your doctor if you are having problems. It's also a good idea to know your test results and keep a list of the medicines you take.  Where can you learn more?  Go to https://www.Neocleus.net/patiented  Enter E868 in the search box to learn more about \"Learning About Pregnancy.\"  Current as of: April 30, 2024  Content Version: 14.4    2076-1255 [employer].   Care instructions adapted under license by your healthcare professional. If you have questions about a medical condition or this instruction, always ask your healthcare professional. [employer] disclaims any warranty or liability for your use of this information.    Weeks 6 to 10 of Your Pregnancy: Care Instructions  During these weeks of pregnancy, your " "body goes through many changes. You may start to feel different, both in your body and your emotions. Each pregnancy is different, so there's no \"right\" way to feel. These early weeks are a time to make healthy choices for you and your pregnancy.    Take a daily prenatal vitamin. Choose one with folic acid in it.   Avoid alcohol, tobacco, and drugs (including marijuana). If you need help quitting, talk to your doctor.     Drink plenty of liquids.  Be sure to drink enough water. And limit sodas, other sweetened drinks, and caffeine.     Choose foods that are good sources of calcium, iron, and folate.  You can try dairy products, dark leafy greens, fortified orange juice and cereals, almonds, broccoli, dried fruit, and beans.     Avoid foods that may be harmful.  Don't eat raw meat, deli meat, raw seafood, or raw eggs. Avoid soft cheese and unpasteurized dairy, like Brie and blue cheese. And don't eat fish that contains a lot of mercury, like shark and swordfish.     Don't touch daniel litter or cat poop.  They can cause an infection that could be harmful during pregnancy.     Avoid things that can make your body too hot.  For example, avoid hot tubs and saunas.     Soothe morning sickness.  Try eating 5 or 6 small meals a day, getting some fresh air, or using elida to control symptoms.     Ask your doctor about flu and COVID-19 shots.  Getting them can help protect against infection.   Follow-up care is a key part of your treatment and safety. Be sure to make and go to all appointments, and call your doctor if you are having problems. It's also a good idea to know your test results and keep a list of the medicines you take.  Where can you learn more?  Go to https://www.Sapho.net/patiented  Enter G112 in the search box to learn more about \"Weeks 6 to 10 of Your Pregnancy: Care Instructions.\"  Current as of: April 30, 2024  Content Version: 14.4    5703-4841 [facility] [employer].   Care instructions adapted " under license by your healthcare professional. If you have questions about a medical condition or this instruction, always ask your healthcare professional. [employer] disclaims any warranty or liability for your use of this information.       Pregnancy: Managing Morning Sickness (01:48)  Your health professional recommends that you watch this short online health video.  Learn how to manage morning sickness during pregnancy.   Purpose: Learn how to manage morning sickness during pregnancy.  Goal: Learn how to manage morning sickness during pregnancy.    Watch: Scan the QR code or visit the link to view video       [email or web address].[email or web address]  Current as of: April 30, 2024  Content Version: 14.4    5470-3749 [employer].   Care instructions adapted under license by your healthcare professional. If you have questions about a medical condition or this instruction, always ask your healthcare professional. [employer] disclaims any warranty or liability for your use of this information.    Pregnancy and Heartburn: Care Instructions  Overview     Heartburn is a common problem during pregnancy.  Heartburn happens when stomach acid backs up into the tube that carries food to the stomach. This tube is called the esophagus. Early in pregnancy, heartburn is caused by hormone changes that slow down digestion. Later on, it's also caused by the large uterus pushing up on the stomach.  Even though you can't fix the cause, there are things you can do to get relief. Treating heartburn during pregnancy focuses first on making lifestyle changes, like changing what and how you eat, and on taking medicines.  Heartburn usually improves or goes away after childbirth.  Follow-up care is a key part of your treatment and safety. Be sure to make and go to all appointments, and call your doctor if you are having problems. It's also a good idea to know your test results and keep a list of the medicines you  "take.  How can you care for yourself at home?  Eat small, frequent meals.  Avoid foods that make your symptoms worse, such as chocolate, peppermint, and spicy foods. Avoid drinks with caffeine, such as coffee, tea, and sodas.  Avoid bending over or lying down after meals.  Take a short walk after you eat.  If heartburn is a problem at night, do not eat for 2 hours before bedtime.  Take antacids like Mylanta, Maalox, Rolaids, or Tums. Do not take antacids that have sodium bicarbonate, magnesium trisilicate, or aspirin. Be careful when you take over-the-counter antacid medicines. Many of these medicines have aspirin in them. While you are pregnant, do not take aspirin or medicines that contain aspirin unless your doctor says it is okay.  If you're not getting relief, talk to your doctor. You may be able to take a stronger acid-reducing medicine.  When should you call for help?   Call your doctor now or seek immediate medical care if:    You have new or worse belly pain.     You are vomiting.   Watch closely for changes in your health, and be sure to contact your doctor if:    You have new or worse symptoms of reflux.     You are losing weight.     You have trouble or pain swallowing.     You do not get better as expected.   Where can you learn more?  Go to https://www.Peppercorn.net/patiented  Enter U946 in the search box to learn more about \"Pregnancy and Heartburn: Care Instructions.\"  Current as of: April 30, 2024  Content Version: 14.4    9374-0710 [email or web address]".   Care instructions adapted under license by your healthcare professional. If you have questions about a medical condition or this instruction, always ask your healthcare professional. "[employer] disclaims any warranty or liability for your use of this information.    Constipation: Care Instructions  Overview     Constipation means that you have a hard time passing stools (bowel movements). People pass stools from 3 times a day to " once every 3 days. What is normal for you may be different. Constipation may occur with pain in the rectum and cramping. The pain may get worse when you try to pass stools. Sometimes there are small amounts of bright red blood on toilet paper or the surface of stools. This is because of enlarged veins near the rectum (hemorrhoids).  A few changes in your diet and lifestyle may help you avoid ongoing constipation. Your doctor may also prescribe medicine to help loosen your stool.  Some medicines can cause constipation. These include pain medicines and antidepressants. Tell your doctor about all the medicines you take. Your doctor may want to make a medicine change to ease your symptoms.  Follow-up care is a key part of your treatment and safety. Be sure to make and go to all appointments, and call your doctor if you are having problems. It's also a good idea to know your test results and keep a list of the medicines you take.  How can you care for yourself at home?  Drink plenty of fluids. If you have kidney, heart, or liver disease and have to limit fluids, talk with your doctor before you increase the amount of fluids you drink.  Include high-fiber foods in your diet each day. These include fruits, vegetables, beans, and whole grains.  Get at least 30 minutes of exercise on most days of the week. Walking is a good choice. You also may want to do other activities, such as running, swimming, cycling, or playing tennis or team sports.  Take a fiber supplement, such as Citrucel or Metamucil, every day. Read and follow all instructions on the label.  Schedule time each day for a bowel movement. A daily routine may help. Take your time having a bowel movement, but don't sit for more than 10 minutes at a time. And don't strain too much.  Support your feet with a small step stool when you sit on the toilet. This helps flex your hips and places your pelvis in a squatting position.  Your doctor may recommend an  "over-the-counter laxative to relieve your constipation. Examples are Milk of Magnesia and MiraLax. Read and follow all instructions on the label. Do not use laxatives on a long-term basis.  When should you call for help?   Call your doctor now or seek immediate medical care if:    You have new or worse belly pain.     You have new or worse nausea or vomiting.     You have blood in your stools.   Watch closely for changes in your health, and be sure to contact your doctor if:    Your constipation is getting worse.     You do not get better as expected.   Where can you learn more?  Go to https://www.Con[employer]ged [email or web address].net/patiented  Enter P343 in the search box to learn more about \"Constipation: Care Instructions.\"  Current as of: October 19, 2024  Content Version: 14.4    0734-9158 [employer].   Care instructions adapted under license by your healthcare professional. If you have questions about a medical condition or this instruction, always ask your healthcare professional. [employer] disclaims any warranty or liability for your use of this information.    Learning About High-Iron Foods  What foods are high in iron?     The foods you eat contain nutrients, such as vitamins and minerals. Iron is a nutrient. Your body needs the right amount to stay healthy and work as it should. You can use the list below to help you make choices about which foods to eat.  Here are some foods that contain iron. They have 1 to 2 milligrams of iron per serving.  Fruits  Figs (dried), 5 figs  Vegetables  Asparagus (canned), 6 robles  Stefani, beet, Swiss chard, or turnip greens, 1 cup  Dried peas, cooked,   cup  Seaweed, spirulina (dried),   cup  Spinach, (cooked)   cup or (raw) 1 cup  Grains  Cereals, fortified with iron, 1 cup  Grits (instant, cooked), fortified with iron,   cup  Meats and other protein foods  Beans (kidney, lima, navy, white), canned or cooked,   cup  Beef or lamb, 3 oz  Chicken giblets, 3 " "oz  Chickpeas (garbanzo beans),   cup  Liver of beef, lamb, or pork, 3 oz  Oysters (cooked), 3 oz  Sardines (canned), 3 oz  Soybeans (boiled),   cup  Tofu (firm),   cup  Work with your doctor to find out how much of this nutrient you need. Depending on your health, you may need more or less of it in your diet.  Where can you learn more?  Go to https://www.Scicasts.net/patiented  Enter R005 in the search box to learn more about \"Learning About High-Iron Foods.\"  Current as of: October 7, 2024  Content Version: 14.4    4330-7421 [employer].   Care instructions adapted under license by your healthcare professional. If you have questions about a medical condition or this instruction, always ask your healthcare professional. [employer] disclaims any warranty or liability for your use of this information.    Rubella (Pakistani Measles): Care Instructions  Overview  Rubella, also called Pakistani measles or 3-day measles, is a disease caused by a virus. It spreads by coughs, sneezes, and close contact. Rubella usually is mild and does not cause long-term problems. But if you are pregnant and get it, you can give the disease to your unborn baby. This can cause serious birth defects.  While you have rubella, you may get a rash and a mild fever, and the lymph glands in your neck may swell. Older children often have a fever, eye pain, a sore throat, and body aches. You can relieve most symptoms with care at home. Avoid being around others, especially pregnant people, until your rash has been gone for at least 4 days. People who have not had this disease before or have not had the vaccine have the greatest chance of getting the virus.  Follow-up care is a key part of your treatment and safety. Be sure to make and go to all appointments, and call your doctor if you are having problems. It's also a good idea to know your test results and keep a list of the medicines you take.  How can you care for yourself at " "home?  Drink plenty of fluids. If you have kidney, heart, or liver disease and have to limit fluids, talk with your doctor before you increase the amount of fluids you drink.  Get plenty of rest to help your body heal.  Take an over-the-counter pain medicine, such as acetaminophen (Tylenol), ibuprofen (Advil, Motrin), or naproxen (Aleve), to reduce fever and discomfort. Read and follow all instructions on the label. Do not give aspirin to anyone younger than 20. It has been linked to Reye syndrome, a serious illness.  Do not take two or more pain medicines at the same time unless the doctor told you to. Many pain medicines have acetaminophen, which is Tylenol. Too much acetaminophen (Tylenol) can be harmful.  Try not to scratch the rash. Put cold, wet cloths on the rash to reduce itching.  Do not smoke. Smoking can make your symptoms worse. If you need help quitting, talk to your doctor about stop-smoking programs and medicines. These can increase your chances of quitting for good.  Avoid contact with people who have never had rubella and who have not been immunized.  When should you call for help?   Call your doctor now or seek immediate medical care if:    You have a fever with a stiff neck or a severe headache.     You are sensitive to light or feel very sleepy or confused.   Watch closely for changes in your health, and be sure to contact your doctor if:    You do not get better as expected.   Where can you learn more?  Go to https://www.[employer].net/patiented  Enter B812 in the search box to learn more about \"Rubella (Lebanese Measles): Care Instructions.\"  Current as of: April 30, 2024  Content Version: 14.4    9700-9735 [employer].   Care instructions adapted under license by your healthcare professional. If you have questions about a medical condition or this instruction, always ask your healthcare professional. [employer] disclaims any warranty or liability for your use of this " information.    Gonorrhea and Chlamydia: About These Tests  What is it?  These tests use a sample of urine or other body fluid to look for the bacteria that cause these sexually transmitted infections (STIs). The fluid sample can come from the cervix, vagina, rectum, throat, or eyes.  Why is this test done?  These tests may be done to:  Find out if symptoms are caused by gonorrhea or chlamydia.  Check people who are at high risk of being infected with gonorrhea or chlamydia.  Retest people several months after they have been treated for gonorrhea or chlamydia.  Check for infection in your  if you had a gonorrhea or chlamydia infection at the time of delivery.  How can you prepare for the test?  If you are going to have a urine test, do not urinate for at least 1 hour before the test.  If you think you may have chlamydia or gonorrhea, don't have sexual intercourse until you get your test results. And you may want to have tests for other STIs, such as HIV.  How is the test done?  For a direct sample, a swab is used to collect body fluid from the cervix, vagina, rectum, throat, or eyes. Your doctor may collect the sample. Or you may be given instructions on how to collect your own sample.  For a urine sample, you will collect the urine that comes out when you first start to urinate. Don't wipe the genital area clean before you urinate.  How long does the test take?  The test will take a few minutes.  What happens after the test?  You will be able to go home right away.  You can go back to your usual activities right away.  If you do have an infection, don't have sexual intercourse for 7 days after you start treatment. And your sex partner(s) should also be treated.  Follow-up care is a key part of your treatment and safety. Be sure to make and go to all appointments, and call your doctor if you are having problems. It's also a good idea to keep a list of the medicines you take. Ask your doctor when you can  "expect to have your test results.  Where can you learn more?  Go to https://www.[employer]airPal.net/patiented  Enter K976 in the search box to learn more about \"Gonorrhea and Chlamydia: About These Tests.\"  Current as of: April 30, 2024  Content Version: 14.4    8218-5965 [employer].   Care instructions adapted under license by your healthcare professional. If you have questions about a medical condition or this instruction, always ask your healthcare professional. [employer] disclaims any warranty or liability for your use of this information.    Trichomoniasis: About This Test  What is it?     This test uses a sample of urine or other body fluid to look for the tiny parasite that causes trichomoniasis (also called trich). The fluid sample can come from the vagina, cervix, or urethra. Your doctor may choose to use one or more of many available tests.  Why is it done?  A trich test may be done to:  Find out if symptoms are caused by trich.  Check people who are at high risk for being infected with trich.  Check after treatment to make sure that the infection is gone.  How do you prepare for the test?  If you are going to have a urine test, do not urinate for at least 1 hour before the test.  How is the test done?  For a direct sample, a swab is used to collect body fluid from the cervix, vagina, or urethra. Your doctor may collect the sample. Or you may be given instructions on how to collect your own sample.  For a urine sample, you will collect the urine that comes out when you first start to urinate. Don't wipe the area clean before you urinate.  How long does the test take?  It will take a few minutes to collect a sample.  What happens after the test?  You can go home right away.  You can go back to your usual activities right away.  You may get the test results the same day or several days later. It depends on the test used.  If you do have an infection, don't have sexual intercourse for 7 " days after you start treatment. Your sex partner or partners should also be treated.  Follow-up care is a key part of your treatment and safety. Be sure to make and go to all appointments, and call your doctor if you are having problems. Ask your doctor when you can expect to have your test results.  Current as of: April 30, 2024  Content Version: 14.4    9845-0566 [employer].   Care instructions adapted under license by your healthcare professional. If you have questions about a medical condition or this instruction, always ask your healthcare professional. [employer] disclaims any warranty or liability for your use of this information.    HIV Testing: Care Instructions  Overview  You can get tested for the human immunodeficiency virus (HIV). Most doctors use a blood test to check for HIV antibodies and antigens in your blood. It may also check for the genetic material (RNA) of HIV. Some tests use saliva to check for HIV antibodies. But these aren't as accurate. For example, they may give a false result if you've just been infected.  What do the results mean?        Normal (negative)   No HIV antibodies, antigens, or RNA were found.  You may need more testing. It can make sure your test results are correct.        Uncertain (indeterminate)   Test results didn't clearly show if you have an HIV infection.  HIV antibodies or antigens may not have formed yet.  Some other type of antibody or antigen may have affected the results.  You will need another test to be sure.        Abnormal (positive)   HIV antibodies, antigens, or RNA were found.  If you haven't had an RNA test yet, one will be done. If it's positive, you have HIV.  If your test result is positive, your doctor will talk to you. You will discuss starting treatment.  Follow-up care is a key part of your treatment and safety. Be sure to make and go to all appointments, and call your doctor if you are having problems. It's also a good  "idea to know your test results and keep a list of the medicines you take.  Where can you learn more?  Go to https://www.[employer].net/patiented  Enter T792 in the search box to learn more about \"HIV Testing: Care Instructions.\"  Current as of: April 30, 2024  Content Version: 14.4    0277-4605 [employer].   Care instructions adapted under license by your healthcare professional. If you have questions about a medical condition or this instruction, always ask your healthcare professional. [employer] disclaims any warranty or liability for your use of this information.    Hepatitis C Virus Tests: About These Tests  What are they?     Hepatitis C virus tests are blood tests that check for substances in the blood that show whether you have hepatitis C now or had it in the past. The tests can also tell you what type of hepatitis C you have and how severe the disease is. This can help your doctor with treatment.  If the tests show that you have long-term hepatitis C, you need to take steps to prevent spreading the disease.  Why are these tests done?  You may need these tests if:  You have symptoms of hepatitis.  You may have been exposed to the virus. You are more likely to have been exposed to the virus if you inject drugs or are exposed to body fluids (such as if you are a health care worker).  You've had other tests that show you have liver problems.  You are 18 to 79 years old.  You have an HIV infection.  The tests also are done to help your doctor decide about your treatment and see how well it works.  How do you prepare for the test?  In general, there's nothing you have to do before this test, unless your doctor tells you to.  How is the test done?  A health professional uses a needle to take a blood sample, usually from the arm.  What happens after these tests?  You will probably be able to go home right away.  You can go back to your usual activities right away.  Follow-up care is a key " "part of your treatment and safety. Be sure to make and go to all appointments, and call your doctor if you are having problems. It's also a good idea to keep a list of the medicines you take. Ask your doctor when you can expect to have your test results.  Where can you learn more?  Go to https://www.[email or web address].net/patiented  Enter W551 in the search box to learn more about \"Hepatitis C Virus Tests: About These Tests.\"  Current as of: April 30, 2024  Content Version: 14.4    6912-2565 [employer].   Care instructions adapted under license by your healthcare professional. If you have questions about a medical condition or this instruction, always ask your healthcare professional. [employer] disclaims any warranty or liability for your use of this information.    Learning About Fetal Ultrasound Results  What is a fetal ultrasound?     Fetal ultrasound is a test that lets your doctor see an image of your baby. Your doctor learns information about your baby from this picture. You may find out, for example, if you are having a boy or a girl. But the main reason you have this test is to get information about your baby's health.  (You may hear your baby called a fetus. This is a common medical term for a baby that's growing in the mother's uterus.)  What kind of information can you learn from this test?  The findings of an ultrasound fall into two categories, normal and abnormal.  Normal  The fetus is the right size for its age.  The placenta is the expected size and does not cover the cervix.  There is enough amniotic fluid in the uterus.  No birth defects can be seen.  Abnormal  The fetus is small or large for its age.  The placenta covers the cervix.  There is too much or too little amniotic fluid in the uterus.  The fetus may have a birth defect.  What does an abnormal result mean?  Abnormal seems to imply that something is wrong with your baby. But what it means is that the test has shown " something the doctor wants to take a closer look at.  And that's what happens next. Your doctor will talk to you about what further test or tests you may need.  What do the results mean?  Some of the things your doctor may see on an abnormal ultrasound include:  Echogenic bowel.  The bowel looks very bright on the screen. This could mean that there's blood in the bowel. Or it could mean that something is blocking the small bowel.  Increased nuchal translucency.  The ultrasound measures the thickness at the back of the baby's neck. An increase in thickness is sometimes an early sign of Down syndrome.  Increased or decreased amniotic fluid.  The doctor will look for a reason for the level of amniotic fluid and will watch the pregnancy closely as it progresses.  Large ventricles.  Ventricles in the brain look larger than they should. Your doctor may take a closer look at the brain.  Renal pyelectasis/hydronephrosis.  The ultrasound measures the fluid around the kidney. If there is more fluid than expected, there is a chance of urinary tract or kidney problems.  Short long bones.  The ultrasound measures certain arm and leg bones. A long bone (humerus or femur) that is shorter than average could be a sign of Down syndrome.  Subchorionic hemorrhage.  An ultrasound can show bleeding under one of the membranes that surrounds the fetus. Some women don't have symptoms of bleeding. The ultrasound can find this problem when women are not bleeding from their vagina. Women who have this condition have a slightly higher chance of miscarriage.  What do you do now?  Take a deep breath, and let it out. Keep in mind that an abnormal finding on an ultrasound, after it's coupled with more information, may:  Turn out to be nothing.  Turn out to be something mild that won't affect the baby.  Turn out to be something more serious. But if this happens, early diagnosis helps you and your doctor plan treatment options sooner rather than  "later.  Your medical team is there for you. So are your family and friends. Ask questions, and get the help and support you need.  Follow-up care is a key part of your treatment and safety. Be sure to make and go to all appointments, and call your doctor if you are having problems. It's also a good idea to know your test results and keep a list of the medicines you take.  Where can you learn more?  Go to https://www.[employer].net/patiented  Enter K451 in the search box to learn more about \"Learning About Fetal Ultrasound Results.\"  Current as of: April 30, 2024  Content Version: 14.4    5461-3796 [employer].   Care instructions adapted under license by your healthcare professional. If you have questions about a medical condition or this instruction, always ask your healthcare professional. [employer] disclaims any warranty or liability for your use of this information.    Learning About Prenatal Visits  Overview     Regular prenatal visits are very important during any pregnancy. These quick office visits may seem simple and routine. But they can help you have a safe and healthy pregnancy. Your doctor is watching for problems that can only be found through regular checkups. The visits also give you and your doctor time to build a good relationship.  After your first visit, you will most likely start on a schedule of monthly visits. In your third trimester, the visits will get more frequent. Based on your health, your age, and if you've had a normal, full-term pregnancy before, your doctor may want to see you more or less often.  At different times in your pregnancy, you will have exams and tests. Some are routine. Others are done only when there is a chance of a problem. Everything healthy you do for your body helps you have a healthy pregnancy. Rest when you need it. Eat well, drink plenty of water, and exercise regularly.  What happens during a prenatal visit?  You will have blood pressure " checks, along with urine tests. You also may have blood tests. If you need to go to the bathroom while waiting for the doctor, tell the nurse. You will be given a sample cup so your urine can be tested.  You will be weighed and have your belly measured.  Your doctor may listen to the fetal heartbeat with a special device.  At about 24 weeks, and possibly earlier in your pregnancy, your doctor will check your blood sugar (glucose tolerance test) for diabetes that can occur during pregnancy. This is gestational diabetes, which can be harmful.  You will have tests to check for infections that could harm your . These include group B streptococcus and hepatitis B.  Your doctor may do ultrasounds to check for problems. This also checks the position of the fetus. An ultrasound uses sound waves to produce a picture of the fetus.  You may get your vaccines updated.  Your doctor may ask you questions to check for signs of anxiety or depression. Tell your doctor if you feel sad, anxious, or hopeless for more than a few days.  You may have other tests at any time during your pregnancy.  Use your visits to discuss with your doctor any concerns you have.  How can you care for yourself at home?  Get plenty of rest.  Try to exercise every day, if your doctor says it is okay. If you have not exercised in the past, start out slowly. For example, you can take short walks each day.  Choose healthy foods, such as fruits, vegetables, whole grains, lean proteins, low-fat dairy, and healthy fats.  Drink plenty of fluids. Cut down on drinks with caffeine, such as coffee, tea, and cola. If you have kidney, heart, or liver disease and have to limit fluids, talk with your doctor before you increase the amount of fluids you drink.  Try to avoid chemical fumes, paint fumes, and poisons.  If you smoke, vape, or use alcohol, marijuana, or other drugs, quit or cut back as much as you can. Talk to your doctor if you need help  "quitting.  Review all of your medicines, including over-the-counter medicines and supplements, with your doctor. Some of your routine medicines may need to be changed. Do not stop or start taking any medicines without talking to your doctor first.  Follow-up care is a key part of your treatment and safety. Be sure to make and go to all appointments, and call your doctor if you are having problems. It's also a good idea to know your test results and keep a list of the medicines you take.  Where can you learn more?  Go to https://www.[employer].net/patiented  Enter J502 in the search box to learn more about \"Learning About Prenatal Visits.\"  Current as of: April 30, 2024  Content Version: 14.4    7455-3176 [employer].   Care instructions adapted under license by your healthcare professional. If you have questions about a medical condition or this instruction, always ask your healthcare professional. [employer] disclaims any warranty or liability for your use of this information.    Intimate Partner Violence: Care Instructions  Overview     If you want to save this information but don't think it is safe to take it home, see if a trusted friend can keep it for you. Plan ahead. Know who you can call for help, and memorize the phone number.   Be careful online too. Your online activity may be seen by others. Do not use your personal computer or device to read about this topic. Use a safe computer, such as one at work, a friend's home, or a library.    Intimate partner violence--a type of domestic abuse--is different from an argument now and then. It is a pattern of abuse that one person may use to control another person's behavior. It may start with threats and name-calling. Then, it may lead to more serious acts, like pushing and slapping. The abuse also may occur in other areas. For example, the abuser may withhold money or spend a partner's money without their knowledge.  Abuse can cause serious " harm. You are more likely to have a long-term health problem from the injuries and stress of living in a violent relationship. People who are sexually abused by their partners have more sexually transmitted infections and unplanned pregnancies. Anyone who is abused also faces emotional pain. Anyone can be abused in relationships. In some relationships, both people use abusive behavior.  If you are pregnant, abuse can cause problems such as poor weight gain, infections, and bleeding. Abuse during this time may increase your baby's risk of low birth weight, premature birth, and death.  Follow-up care is a key part of your treatment and safety. Be sure to make and go to all appointments, and call your doctor if you are having problems. It's also a good idea to know your test results and keep a list of the medicines you take.  How can you care for yourself at home?  If you do not have a safe place to stay, discuss this with your doctor before you leave.  Have a plan for where to go, how to leave your home, and where to stay in case of an emergency. Do not tell your partner about your plan. Contact:  The National Domestic Violence Hotline toll-free at 1-762.217.1439. They can help you find resources in your area.  Your local police department, hospital, or clinic for information about shelters and safe homes near you.  Talk to a trusted friend or neighbor, a counselor, or a te leader. Do not feel that you have to hide what happened.  Teach your children how to call for help in an emergency.  Be alert to warning signs, such as threats, heavy alcohol use, or drug use. This can help you avoid danger.  If you can, make sure that there are no guns or other weapons in your home.  When should you call for help?   Call 911 anytime you think you may need emergency care. For example, call if:    You or someone else has just been abused.     You think you or someone else is in danger of being abused.   Watch closely for changes  "in your health, and be sure to contact your doctor if you have any problems.  Where can you learn more?  Go to https://www.[email or web address].net/patiented  Enter G282 in the search box to learn more about \"Intimate Partner Violence: Care Instructions.\"  Current as of: July 31, 2024  Content Version: 14.4    6432-5790 [employer].   Care instructions adapted under license by your healthcare professional. If you have questions about a medical condition or this instruction, always ask your healthcare professional. [employer] disclaims any warranty or liability for your use of this information.    Intimate Partner Violence Safety Instructions: Care Instructions  Overview     If you want to save this information but don't think it is safe to take it home, see if a trusted friend can keep it for you. Plan ahead. Know who you can call for help, and memorize the phone number.   Be careful online too. Your online activity may be seen by others. Do not use your personal computer or device to read about this topic. Use a safe computer, such as one at work, a friend's home, or a library.    When you are abused by a spouse or partner, you can take actions to protect yourself and your children.  You can increase your safety whether you decide to stay with your spouse or partner or you decide to leave. You may want to make a safety plan and pack a bag ahead of time. This will help you leave quickly when you decide to. Remember, you cannot change your partner's actions, but you can find help for you and your children. No one deserves to be abused.  Follow-up care is a key part of your treatment and safety. Be sure to make and go to all appointments, and call your doctor if you are having problems. It's also a good idea to know your test results and keep a list of the medicines you take.  How can you care for yourself at home?  Make a plan for your safety   If you decide to stay with your abusive spouse or partner, " you can do the following to increase your safety:  Decide what works best to keep you safe in an emergency.  Know who you can call to help you in an emergency.  Decide if you will call the police if you get hurt again. If you can, agree on a signal with your children or neighbor to call the police for you if you need help. You can flash lights or hang something out of a window.  Choose a safe place to go for a short time if you need to leave home. Memorize the address and phone number.  Learn escape routes out of your home in case you need to leave in a hurry. Teach your children different ways to get out of your home quickly if they need to.  If you can, hide or lock up things that can be used as weapons (guns, knives, hammers).  Learn the number of a domestic violence shelter. Talk to the people there about how they can help.  Find out about other community resources that can help you.  Take pictures of bruises or other injuries if you can. You can also take pictures of things your abuser has broken.  Teach your children that violence is never okay. Tell them that they do not deserve to be hurt.  Pack a bag   Prepare a bag with things you will need if you leave suddenly. Leave it with a friend, a relative, or someone else you trust. You could include the following items in the bag:  A set of keys to your home and car.  Emergency phone numbers and addresses.  Money such as cash or checks. You can also ask a friend, a relative, or someone else you trust to hold money for you.  Copies of legal documents such as house and car titles or rent receipts, birth certificates, Social Security card, voter registration, marriage and 's licenses, and your children's health records.  Personal items you would need for a few days, such as clothes, a toothbrush, toothpaste, and any medicines you or your children need.  A favorite toy or book for your child or children.  Diapers and bottles, if you have very young  "children.  Pictures that show signs of abuse and violence. You may also add pictures of your abuser.  If you leave   If you decide to leave, you can take the following steps:  Go to the emergency room at a hospital if you have been hurt.  Think about asking the police to be with you as you leave. They can protect you as you leave your home.  If you decide to leave secretly, remember that activities can be tracked. Your abuser may still have access to your cell phone, email, and credit cards. It may be possible for these to be traced. Always be aware of your surroundings.  If this is an emergency, do not worry about gathering up anything. Just leave--your safety is most important.  If your abuser moves out, change the locks on the doors. If you have a security system, change the access code.  When should you call for help?   Call 911 anytime you think you may need emergency care. For example, call if:    You or someone else has just been abused.     You think you or someone else is in danger of being abused.   Watch closely for changes in your health, and be sure to contact your doctor if you have any problems.  Where can you learn more?  Go to https://www.[employer].net/patiented  Enter A752 in the search box to learn more about \"Intimate Partner Violence Safety Instructions: Care Instructions.\"  Current as of: July 31, 2024  Content Version: 14.4    5319-8764 [employer].   Care instructions adapted under license by your healthcare professional. If you have questions about a medical condition or this instruction, always ask your healthcare professional. [employer] disclaims any warranty or liability for your use of this information.    Learning About Intimate Partner Violence  What is intimate partner violence?  Intimate partner violence is a type of domestic abuse. It's threatening, emotionally harmful, or violent behavior in a personal relationship. It can happen between past or current " partners or spouses. In some relationships both people abuse each other. One partner may be more abusive. Or the abuse may be equal.  Abuse can affect people of any ethnic group, race, or Taoist. It can affect teens, adults, or the elderly. And it can happen to people of any sexual orientation, gender, or social status.  Abusers use fear, bullying, and threats to control their partners. They may control what their partners do. They may control where their partners go or who they see. They may act jealous, controlling, or possessive. These early signs of abuse may happen soon after the start of the relationship. Sometimes it can be hard to notice abuse at first. But after the relationship becomes more serious, the abuse may get worse.  If you are being abused in your relationship, it's important to get help. The abuse is not your fault. You don't have to face it alone.  Be careful  It may not be safe to take home domestic abuse information like this handout. Some people ask a trusted friend to keep it for them. It's also important to plan ahead and to memorize the phone number of places you can go for help. If you are concerned about your safety, do not use your computer, smartphone, or tablet to read about domestic abuse.   What are the types of intimate partner violence?  Abuse can happen in different ways. Each type can happen on its own or in combination with others.  Emotional abuse  Emotional abuse is a pattern of threats, insults, or controlling behavior. It includes verbal abuse. It goes beyond healthy disagreements in a relationship. It's a sign of an unhealthy relationship.  Do you feel threatened, intimidated, or controlled?  Does your partner:  Threaten your children, other family members, or pets?  Use jokes meant to embarrass or shame you?  Call you names?  Tell you that you are a bad parent?  Threaten to take away your children?  Threaten to have you or your family members deported?  Control your  access to money or other basic needs?  Control what you do, who you see or talk to, or where you go?  Another form of emotional abuse is denying that it is happening. Or the abuser may act like the abuse is no big deal or is your fault.  Sexual abuse  With sexual abuse, abusers may try to convince or force you to have sex. They may force you into sex acts you're not comfortable with. Or they may sexually assault you. Sexual abuse can happen even if you are in a committed relationship.  Physical abuse  Physical abuse means that a partner hits, kicks, or does something else to physically hurt you. Physical abuse that starts with a slap might lead to kicking, shoving, and choking over time. The abuser may also threaten to hurt or kill you.  Stalking  Stalking means that an abuser gives you attention that you do not want and that causes you fear. Examples of stalking include:  Following you.  Showing up at places where the abuser isn't invited, such as at your work or school.  Constantly calling or texting you.  What problems can  to?  Intimate partner violence can be very dangerous. It can cause serious, repeated injury. It can even lead to death.  All forms of abuse can cause long-term health problems from the stress of a violent relationship. Verbal abuse can lead to sexual and physical abuse.  Abuse causes:  Emotional pain.  Depression.  Anxiety.  Post-traumatic stress.  Sexual abuse can lead to sexually transmitted infections (such as HIV/AIDS) and unplanned pregnancy.  Pregnancy can be a very dangerous time for people in abusive relationships. Abuse can cause or increase the risk of problems during pregnancy. These include low weight gain, anemia, infections, and bleeding. Abuse may also increase your baby's risk of low birth weight, premature birth, and death.  It can be hard for some victims of abuse to ask for help or to leave their relationship. You may feel scared, stuck, or not sure what steps to  "take. But it's important not to ignore abuse. Talking to someone you trust could be the first step to ending the abuse and taking care of your own health and happiness again. There are resources available that can help keep you safe.  Where can you get help?  Talk to a trusted friend. Find a local advocacy group, or talk to your doctor about the abuse.  Contact the National Domestic Violence Hotline at 4-250-206-IHAK (1-943.542.3045) for more safety tips. They can guide you to groups in your area that can help. Or go to the National Coalition Against Domestic Violence website at www.[employer].org to learn more.  Domestic violence groups or a counselor in your area can help you make a safety plan for yourself and your children.  When to call for help  Call 911 anytime you think you may need emergency care. For example, call if:  You think that you or someone you know is in danger of being abused.  You have been hurt and can't have someone safely take you to emergency care.  You have just been abused.  A family member has just been abused.  Where can you learn more?  Go to https://www.[employer].net/patiented  Enter S665 in the search box to learn more about \"Learning About Intimate Partner Violence.\"  Current as of: July 31, 2024  Content Version: 14.4    9533-3572 [employer].   Care instructions adapted under license by your healthcare professional. If you have questions about a medical condition or this instruction, always ask your healthcare professional. [employer] disclaims any warranty or liability for your use of this information.    Vaginal Bleeding During Pregnancy: Care Instructions  Overview     It's common to have some vaginal spotting when you are pregnant. In some cases, the bleeding isn't serious. And there aren't any more problems with the pregnancy.  But sometimes bleeding is a sign of a more serious problem. This is more common if the bleeding is heavy or painful. Examples " of more serious problems include miscarriage, an ectopic pregnancy, and a problem with the placenta.  You may have to see your doctor again to be sure everything is okay. You may also need more tests to find the cause of the bleeding.  Home treatment may be all you need. But it depends on what is causing the bleeding. Be sure to tell your doctor if you have any new symptoms or if your symptoms get worse.  The doctor has checked you carefully, but problems can develop later. If you notice any problems or new symptoms, get medical treatment right away.  Follow-up care is a key part of your treatment and safety. Be sure to make and go to all appointments, and call your doctor if you are having problems. It's also a good idea to know your test results and keep a list of the medicines you take.  How can you care for yourself at home?  If your doctor prescribed medicines, take them exactly as directed. Call your doctor if you think you are having a problem with your medicine.  Do not have vaginal sex until your doctor says it's okay.  Do not put anything in your vagina until your doctor says it's okay.  Ask your doctor about other activities you can or can't do.  Get a lot of rest. Being pregnant can make you tired.  Do not use nonsteroidal anti-inflammatory drugs (NSAIDs), such as ibuprofen (Advil, Motrin), naproxen (Aleve), or aspirin, unless your doctor says it is okay.  When should you call for help?   Call 911 anytime you think you may need emergency care. For example, call if:    You passed out (lost consciousness).     You have severe vaginal bleeding. This means you are soaking through a pad each hour for 2 or more hours.     You have sudden, severe pain in your belly or pelvis.   Call your doctor now or seek immediate medical care if:    You have new or worse vaginal bleeding.     You are dizzy or lightheaded, or you feel like you may faint.     You have pain in your belly, pelvis, or lower back.     You think  that you are in labor.     You have a sudden release of fluid from your vagina.     You've been having regular contractions for an hour. This means that you've had at least 8 contractions within 1 hour or at least 4 contractions within 20 minutes, even after you change your position and drink fluids.     You notice that your baby has stopped moving or is moving much less than normal.   Watch closely for changes in your health, and be sure to contact your doctor if you have any problems.  Current as of: April 30, 2024  Content Version: 14.4    7627-9538 [employer].   Care instructions adapted under license by your healthcare professional. If you have questions about a medical condition or this instruction, always ask your healthcare professional. [employer] disclaims any warranty or liability for your use of this information.

## 2025-04-23 ENCOUNTER — PATIENT OUTREACH (OUTPATIENT)
Dept: CARE COORDINATION | Facility: CLINIC | Age: 25
End: 2025-04-23
Payer: COMMERCIAL

## 2025-05-02 ENCOUNTER — ANCILLARY PROCEDURE (OUTPATIENT)
Dept: ULTRASOUND IMAGING | Facility: CLINIC | Age: 25
End: 2025-05-02
Attending: OBSTETRICS & GYNECOLOGY
Payer: COMMERCIAL

## 2025-05-02 DIAGNOSIS — O09.91 SUPERVISION OF HIGH RISK PREGNANCY IN FIRST TRIMESTER: ICD-10-CM

## 2025-05-02 DIAGNOSIS — O36.80X0 PREGNANCY WITH INCONCLUSIVE FETAL VIABILITY: ICD-10-CM

## 2025-05-02 PROCEDURE — 76801 OB US < 14 WKS SINGLE FETUS: CPT | Performed by: STUDENT IN AN ORGANIZED HEALTH CARE EDUCATION/TRAINING PROGRAM

## 2025-05-14 ENCOUNTER — RESULTS FOLLOW-UP (OUTPATIENT)
Dept: OBGYN | Facility: CLINIC | Age: 25
End: 2025-05-14